# Patient Record
(demographics unavailable — no encounter records)

---
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
## 2024-10-15 NOTE — PLAN
[FreeTextEntry2] : Problems\par  1. Pt. with shame/embarrassment about appearance of sxs.\par  2. Pt. with difficulty coping with pain.\par  3. Pt. with ongoing insomnia and resulting daytime sleepiness\par  4. pt. with difficulty coping with stress - presently with several psychosocial stressors.\par  5. Pt. out of work.\par  tx will consist of CBT approach to anxiety and coping with hope of reducing somatic sxs. Treatment will also utilize goal setting framework to help pt. ideally increase functionality. \par   [Cognitive and/or Behavior Therapy] : Cognitive and/or Behavior Therapy  [Supportive Therapy] : Supportive Therapy [de-identified] : Session conducted via telehealth, mental status WNL.  Pt. continues to report stable mood despite ongoing stressors and pain.  Informed pt. of our pain team and encouraged pt. to consider scheduling a consultation with them.  Pt. discussed her efforts to be active despite the pain.  Reminded her of the importance of pacing and adaptation.  Pt. discussed interpersonal/family stressors.  Reflected her resilience in the face of these stressors and explored factors contributing to her coping.  Pt. noted benefit from shift in expectations and thoughts as well as renewed sense of kalpana. [FreeTextEntry1] : Pt. to engage in short-term CBT oriented psychotherapy.\par
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
## 2024-11-05 NOTE — DISCUSSION/SUMMARY
[FreeTextEntry1] : In summary, Ms. Monroy is a 50year old right handed woman being followed after sudden onset of abnormal movements. She states some improvement in her back pain, but there still is a lot of fluctuations. 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  Since last visit , worsening of low back pain with radiation, with burning pain in feet. her previous lumbar spine MRI showed disc herniation.  Plan: - Will obtain Mr lumbar spine to r/o structural pathology - patient had some neuropathy workup before, will obtain some more labs: B12, B6, Vit-E, protein electrophoresis.  - Advised to increase Gabapentin 600 mg , she will let know if it works.   Patient is seen and discussed with Dr. William HUERTA Movement Disorder Fellow

## 2024-11-05 NOTE — PHYSICAL EXAM
[Person] : oriented to person [Place] : oriented to place [Time] : oriented to time [Cranial Nerves Oculomotor (III)] : extraocular motion intact [Cranial Nerves Facial (VII)] : face symmetrical [Cranial Nerves Vestibulocochlear (VIII)] : hearing was intact bilaterally [Cranial Nerves Glossopharyngeal (IX)] : tongue and palate midline [Motor Strength] : muscle strength was normal in all four extremities [Motor Handedness Right-Handed] : the patient is right hand dominant [Sensation Tactile Decrease] : light touch was intact [Sensation Vibration Decrease] : vibration was intact [1+] : Brachioradialis left 1+ [0] : Ankle jerk left 0 [Paresis Pronator Drift Right-Sided] : no pronator drift on the right [Paresis Pronator Drift Left-Sided] : no pronator drift on the left [Coordination - Dysmetria Impaired Finger-to-Nose Bilateral] : not present [FreeTextEntry5] : No KF rings seen [FreeTextEntry6] : Left upper and lower extremity distally 4+/5 [FreeTextEntry1] : Facial expression and speech volume is normal.  Tone in voice is normal.  Extraocular movements were intact with no square waves jerk seen.  Able to protrude tongue for 10 seconds. No resting tremor. Improved postural tremor. upper body arrhythmic jerks noted that suppresses with distraction reflex: biceps: 2+ brachioradialis: 2+ patella:1+ ankle :2+ pin prick reduced in at foot improved at the mid calf level plantar down going  Walks with improved left limp. Tone normal.

## 2024-11-05 NOTE — RISK ASSESSMENT
Patient seen dr. Bustos in 2017 she said that she does not care who she sees Now Dr. farris or Dr. Ibrahim?    [No, patient denies ideation or behavior] : No, patient denies ideation or behavior [Low acute suicide risk] : Low acute suicide risk
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
## 2024-11-05 NOTE — PLAN
[FreeTextEntry2] : Problems\par  1. Pt. with shame/embarrassment about appearance of sxs.\par  2. Pt. with difficulty coping with pain.\par  3. Pt. with ongoing insomnia and resulting daytime sleepiness\par  4. pt. with difficulty coping with stress - presently with several psychosocial stressors.\par  5. Pt. out of work.\par  tx will consist of CBT approach to anxiety and coping with hope of reducing somatic sxs. Treatment will also utilize goal setting framework to help pt. ideally increase functionality. \par   [Cognitive and/or Behavior Therapy] : Cognitive and/or Behavior Therapy  [Supportive Therapy] : Supportive Therapy [de-identified] : Session conducted via telehealth, mental status WNL.  Pt. continues to report stable mood despite ongoing stressors and pain.  She noted that she has an upcoming appointment scheduled with Dr. Crowell for pain management as well as an upcoming appointment with Dr. Thomas for neurological follow up.  Since our last session, pt. has been doing PT consistently and has gone back to water aerobics 1x per week.  She noted ongoing pain but expressed some hopefulness around functional gains despite pain.  Discussed various sources of stress and reflected on patient's solid coping.  Commended her overall efforts. [FreeTextEntry1] : Pt. to engage in short-term CBT oriented psychotherapy.\par
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
## 2024-11-23 NOTE — PHYSICAL EXAM
[No Edema] : there was no peripheral edema [Normal] : soft, non-tender, non-distended, no masses palpated, no HSM and normal bowel sounds [de-identified] : slight resting tremor of left extremities

## 2024-11-23 NOTE — REVIEW OF SYSTEMS
[Recent Change In Weight] : ~T recent weight change [Negative] : Gastrointestinal [de-identified] : see hpi
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
## 2024-11-23 NOTE — PHYSICAL EXAM
[No Edema] : there was no peripheral edema [Normal] : soft, non-tender, non-distended, no masses palpated, no HSM and normal bowel sounds [de-identified] : slight resting tremor of left extremities
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
## 2024-11-23 NOTE — REVIEW OF SYSTEMS
[Recent Change In Weight] : ~T recent weight change [Negative] : Gastrointestinal [de-identified] : see hpi
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
## 2024-11-23 NOTE — PHYSICAL EXAM
[No Edema] : there was no peripheral edema [Normal] : soft, non-tender, non-distended, no masses palpated, no HSM and normal bowel sounds [de-identified] : slight resting tremor of left extremities
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
## 2024-11-23 NOTE — REVIEW OF SYSTEMS
[Recent Change In Weight] : ~T recent weight change [Negative] : Gastrointestinal [de-identified] : see hpi

## 2024-11-23 NOTE — REVIEW OF SYSTEMS
[Recent Change In Weight] : ~T recent weight change [Negative] : Gastrointestinal [de-identified] : see hpi

## 2024-11-23 NOTE — ADDENDUM
[FreeTextEntry1] : Incident report  Was sitting on a chair at the end of the visit and given a flu vaccine by nursing staff.  Patient stood up to leave with apparently felt lightheaded and fell on the floor.  Nurse was in the room with her back turned away from her and turned quickly when she heard the fall and appeared to have fallen more on the right side.  Her hearing fell off the right ear, possibly from the fall.  Patient was seen by me within less than 10 seconds of the fall as I was in the adjacent room  Patient was found lying on her back diffuse myoclonic jerks and not responsive to name for about 2 to 3 minutes.  Patient was moved to her right side and to protect her airway there is no sign of any vomitus or loss of bowel or bladder.  Patient gradually became more aware of his surroundings and able to answer appropriately calling  Had  myoclonic seizure at end of visit, sitting on chair and given the flu vaccine.  She stood up, and apparently felt lightheaded and nauseous and fell to ground , landing on her back and witnessed, by the nurse who was still in the room and turned around and saw her falling to the ground , most likely striking the back of her head VSS afebrile Glu 127 bp 130/70's O2 saturation 97% patient became responsive 3 to 5 minutes.  She did not bite her tongue nor did she lose control of her bowel and bladder.  Patient oriented to self and asked that her aunts be called so that she can go to the ER and refer her there, as she is being transported by an ambulance  Imp Known movement disorder with last seizure 6 months ago, now with continued myoclonic jerks that she has all the time with possible vasogenic syncope after being given a flu vaccine.  She probably had not much to eat or drink prior to coming to the office for her physical Plan TO ER by EMT-transported in stable condition Neurologist Nabil Thomas made aware Sister notified and to go to ER now in stable condition Total of 40 additional minutes spent with patient after she fell most likely from vasogenic syncope with persistent of her myoclonic jerks, and woke up with the 3 to 5 minutes of the fall and responding appropriately
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
## 2024-11-23 NOTE — PHYSICAL EXAM
[No Edema] : there was no peripheral edema [Normal] : soft, non-tender, non-distended, no masses palpated, no HSM and normal bowel sounds [de-identified] : slight resting tremor of left extremities
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
## 2024-12-08 NOTE — PLAN
[FreeTextEntry2] : Problems\par  1. Pt. with shame/embarrassment about appearance of sxs.\par  2. Pt. with difficulty coping with pain.\par  3. Pt. with ongoing insomnia and resulting daytime sleepiness\par  4. pt. with difficulty coping with stress - presently with several psychosocial stressors.\par  5. Pt. out of work.\par  tx will consist of CBT approach to anxiety and coping with hope of reducing somatic sxs. Treatment will also utilize goal setting framework to help pt. ideally increase functionality. \par   [Cognitive and/or Behavior Therapy] : Cognitive and/or Behavior Therapy  [Supportive Therapy] : Supportive Therapy [de-identified] : Session conducted via telehealth, mental status WNL.  She noted recent episode of fainting during a doctors visit.  Discussed possibility of vasovagal response from getting a shot.  Also discussed possible impact of building stress.  Pt. reported some improvement in pain with start of new physical therapy and exercise with a ball.  Pt. also more engaged with friends and family (with decrease in pain).  She noted some ongoing struggle with recent losses but noted effort to take steps forward.  Empathized with her ongoing grief while commending her efforts.     [FreeTextEntry1] : Pt. to engage in short-term CBT oriented psychotherapy.\par

## 2025-01-08 NOTE — CONSULT LETTER
[Dear  ___] : Dear  [unfilled], [Consult Letter:] : I had the pleasure of evaluating your patient, [unfilled]. [Please see my note below.] : Please see my note below. [Sincerely,] : Sincerely, [DrDenita  ___] : Dr. ALMAZAN [DrDenita ___] : Dr. ALMAZAN [Consult Closing:] : Thank you very much for allowing me to participate in the care of this patient.  If you have any questions, please do not hesitate to contact me. [FreeTextEntry3] : Susan M. Palleschi, MD, FACS Division of Breast Surgery Director, Breast Surgery [address] [address] (Phone) [phone] (Fax) [phone]

## 2025-01-08 NOTE — REVIEW OF SYSTEMS
[Negative] : Heme/Lymph [FreeTextEntry9] : Fibromyalgia, Degenerative Disc Disease [de-identified] : Non- epileptic seizures/tremors

## 2025-01-08 NOTE — ASSESSMENT
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 I had an extensive discussion with the patient informing her that this is a Stage 0 cancer which has an excellent prognosis with appropriate treatment.  1. Pending completion of her workup, the surgical treatment options of a left Brigid  localization wide excision lumpectomy, followed by post-operative XRT (to reduce risk of local recurrence), vs. left total mastectomy, vs. bilateral total mastectomies with injection of bilateral breasts with Magtrace, if she should so choose, were discussed in detail. The indications, alternatives, benefits and risks were discussed, including but not limited to bleeding, infection, pain, scar, swelling, numbness, change in breast appearance, recurrence (1% risk per year with breast conservation versus <=5% lifetime risk with mastectomy), potential need for additional surgery and lymphedema (if she undergoes LN bx; 5-8% risk with SLN biopsy, 30% risk with axillary LN dissection). The breast cancer booklet and postoperative instructions were reviewed and provided to the patient. 2. The lymphedema instruction sheet was reviewed and provided (only applies if she were to need a LN biopsy). 3. I discussed with her the potential for postoperative radiation therapy. 4. I discussed with her the potential for postoperative adjuvant therapy based on the final results of the surgical path, including the possibility of chemotherapy (if invasive cancer diagnosed) and/or anti-hormonal therapy as indicated. 5. Slide review: to be requested prior to proceeding with surgery. 6. Bilateral breast MRI with IV contrast: advise preop to rule out additional areas of disease for surgical planning. 7. Genetic testing: advise comprehensive genetic testing. I discussed with her that if the genetic testing reveals a breast cancer mutation, I will discuss with her the potential for bilateral mastectomies. 8. Reconstruction: discussed with patient option of reconstruction if she undergoes mastectomy(ies). 9. Additional left stereotactic biopsy to be done if she is considering breast conservation. 10. She is opting to undergo bilateral mastectomies to reduce her potential risk of local recurrence and to potentially avoid the need for radiation and the antihormonal medication. 11. She will see plastic surgeon, Dr. Carrera 1/15/25. 12. Her surgery will be scheduled thereafter. 13. Given her large, ptotic breasts, if she were interested in a nipple sparing approach, she would need a 2 staged approach with an initial lumpectomy with bilateral oncoplastic breast reduction, to be followed subsequently 3 months later with bilateral nipple areolar sparing mastectomies.  She defers undergoing nipple areolar sparing approach.  This patient encounter took 80 minutes today to perform records review, chart preparation, clinical encounter, coordination of care and documentation. I personally reviewed her breast imaging.

## 2025-01-08 NOTE — ASSESSMENT
[FreeTextEntry1] : Left breast ductal carcinoma in situ (DCIS), ER+/CA+. I had an extensive discussion with the patient informing her that this is a Stage 0 cancer which has an excellent prognosis with appropriate treatment.  1. Pending completion of her workup, the surgical treatment options of a left Brigid  localization wide excision lumpectomy, followed by post-operative XRT (to reduce risk of local recurrence), vs. left total mastectomy, vs. bilateral total mastectomies with injection of bilateral breasts with Magtrace, if she should so choose, were discussed in detail. The indications, alternatives, benefits and risks were discussed, including but not limited to bleeding, infection, pain, scar, swelling, numbness, change in breast appearance, recurrence (1% risk per year with breast conservation versus <=5% lifetime risk with mastectomy), potential need for additional surgery and lymphedema (if she undergoes LN bx; 5-8% risk with SLN biopsy, 30% risk with axillary LN dissection). The breast cancer booklet and postoperative instructions were reviewed and provided to the patient. 2. The lymphedema instruction sheet was reviewed and provided (only applies if she were to need a LN biopsy). 3. I discussed with her the potential for postoperative radiation therapy. 4. I discussed with her the potential for postoperative adjuvant therapy based on the final results of the surgical path, including the possibility of chemotherapy (if invasive cancer diagnosed) and/or anti-hormonal therapy as indicated. 5. Slide review: to be requested prior to proceeding with surgery. 6. Bilateral breast MRI with IV contrast: advise preop to rule out additional areas of disease for surgical planning. 7. Genetic testing: advise comprehensive genetic testing. I discussed with her that if the genetic testing reveals a breast cancer mutation, I will discuss with her the potential for bilateral mastectomies. 8. Reconstruction: discussed with patient option of reconstruction if she undergoes mastectomy(ies). 9. Additional left stereotactic biopsy to be done if she is considering breast conservation. 10. She is opting to undergo bilateral mastectomies to reduce her potential risk of local recurrence and to potentially avoid the need for radiation and the antihormonal medication. 11. She will see plastic surgeon, Dr. Carrera 1/15/25. 12. Her surgery will be scheduled thereafter. 13. Given her large, ptotic breasts, if she were interested in a nipple sparing approach, she would need a 2 staged approach with an initial lumpectomy with bilateral oncoplastic breast reduction, to be followed subsequently 3 months later with bilateral nipple areolar sparing mastectomies.  She defers undergoing nipple areolar sparing approach.  This patient encounter took 80 minutes today to perform records review, chart preparation, clinical encounter, coordination of care and documentation. I personally reviewed her breast imaging.

## 2025-01-08 NOTE — PHYSICAL EXAM
[Normocephalic] : normocephalic [Atraumatic] : atraumatic [EOMI] : extra ocular movement intact [Sclera nonicteric] : sclera nonicteric [Supple] : supple [No Supraclavicular Adenopathy] : no supraclavicular adenopathy [No Cervical Adenopathy] : no cervical adenopathy [No Thyromegaly] : no thyromegaly [Clear to Auscultation Bilat] : clear to auscultation bilaterally [Normal Sinus Rhythm] : normal sinus rhythm [Normal S1, S2] : normal S1 and S2 [No Gallops] : no gallops [No Rubs] : no pericardial rub [Examined in the supine and seated position] : examined in the supine and seated position [Bra Size: ___] : Bra Size: [unfilled] [No dominant masses] : no dominant masses in right breast  [No dominant masses] : no dominant masses left breast [No Nipple Retraction] : no left nipple retraction [No Nipple Discharge] : no left nipple discharge [No Axillary Lymphadenopathy] : no left axillary lymphadenopathy [No Edema] : no edema [No Rashes] : no rashes [No Ulceration] : no ulceration [Grade 3] : Ptosis Grade 3 [de-identified] : Core scar: 10:00 (N20cm, parasternal)

## 2025-01-08 NOTE — REVIEW OF SYSTEMS
[Negative] : Heme/Lymph [FreeTextEntry9] : Fibromyalgia, Degenerative Disc Disease [de-identified] : Non- epileptic seizures/tremors

## 2025-01-08 NOTE — CONSULT LETTER
[Dear  ___] : Dear  [unfilled], [Consult Letter:] : I had the pleasure of evaluating your patient, [unfilled]. [Please see my note below.] : Please see my note below. [Sincerely,] : Sincerely, [DrDenita  ___] : Dr. ALMAZAN [DrDenita ___] : Dr. ALMAZAN [Consult Closing:] : Thank you very much for allowing me to participate in the care of this patient.  If you have any questions, please do not hesitate to contact me. [FreeTextEntry3] : Susan M. Palleschi, MD, FACS Division of Breast Surgery Director, Breast Surgery [address] [address] (Phone) [phone] (Fax) [phone]

## 2025-01-08 NOTE — PHYSICAL EXAM
[Normocephalic] : normocephalic [Atraumatic] : atraumatic [EOMI] : extra ocular movement intact [Sclera nonicteric] : sclera nonicteric [Supple] : supple [No Supraclavicular Adenopathy] : no supraclavicular adenopathy [No Cervical Adenopathy] : no cervical adenopathy [No Thyromegaly] : no thyromegaly [Clear to Auscultation Bilat] : clear to auscultation bilaterally [Normal Sinus Rhythm] : normal sinus rhythm [Normal S1, S2] : normal S1 and S2 [No Gallops] : no gallops [No Rubs] : no pericardial rub [Examined in the supine and seated position] : examined in the supine and seated position [Bra Size: ___] : Bra Size: [unfilled] [No dominant masses] : no dominant masses in right breast  [No dominant masses] : no dominant masses left breast [No Nipple Retraction] : no left nipple retraction [No Nipple Discharge] : no left nipple discharge [No Axillary Lymphadenopathy] : no left axillary lymphadenopathy [No Edema] : no edema [No Rashes] : no rashes [No Ulceration] : no ulceration [Grade 3] : Ptosis Grade 3 [de-identified] : Core scar: 10:00 (N20cm, parasternal)

## 2025-01-14 NOTE — END OF VISIT
[facility] Approved until 10/13/2023.      Faxed Letter to [facility] and to [facility].     [Duration of Psychotherapy Visit (minutes spent in synchronous communication): ____] : Duration of Psychotherapy Visit (minutes spent in synchronous communication): [unfilled] [Individual Psychotherapy for 38-52 minutes] : Individual Psychotherapy for 38 - 52 minutes [Licensed Clinician] : Licensed Clinician

## 2025-01-14 NOTE — PLAN
[FreeTextEntry2] : Problems\par  1. Pt. with shame/embarrassment about appearance of sxs.\par  2. Pt. with difficulty coping with pain.\par  3. Pt. with ongoing insomnia and resulting daytime sleepiness\par  4. pt. with difficulty coping with stress - presently with several psychosocial stressors.\par  5. Pt. out of work.\par  tx will consist of CBT approach to anxiety and coping with hope of reducing somatic sxs. Treatment will also utilize goal setting framework to help pt. ideally increase functionality. \par   [Cognitive and/or Behavior Therapy] : Cognitive and/or Behavior Therapy  [Supportive Therapy] : Supportive Therapy [de-identified] : Session conducted via telehealth, mental status notable for upset, tearfulness.  Utilized supportive approach as pt. processed past several weeks and her experience thus far in going through a breast mammography and then biopsy.  Empathized with pt's upset around potential cancer diagnosis as well as her upset around way in which the information was conveyed to her.  Normalized her struggle to cope given the uncertainties of the situation.  Commended pt's use of a probelm-solving approach to build plan for diagnostic clarity and steps forward.  Reflected pt's past resilience in the face of hardship.  [FreeTextEntry1] : Pt. to engage in short-term CBT oriented psychotherapy.\par
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
## 2025-01-15 NOTE — PHYSICAL EXAM
[Bra Size: _______] : Bra Size: [unfilled] [de-identified] : large bilateral pendulous breasts, soft, nt. grade 3 nipple ptosis.  [de-identified] : abdomen soft, nt. +excess lower abdominal tissue-smaller than current breast volume. no obvious masses or hernias.

## 2025-01-15 NOTE — CONSULT LETTER
[Dear  ___] : Dear ~SHEEBA, [Consult Letter:] : I had the pleasure of evaluating your patient, [unfilled]. [Please see my note below.] : Please see my note below. [Consult Closing:] : Thank you very much for allowing me to participate in the care of this patient.  If you have any questions, please do not hesitate to contact me. [Sincerely,] : Sincerely, [FreeTextEntry2] : Susan Palleschi, MD [FreeTextEntry3] : Alexander Carrera MD, FACS Professor and System Vice-Chair, Department of Surgery, [facility] Director of Oncologic Reconstruction, [facility] , [facility]  [facility] [address] [place] [address] Phone: [phone] Fax: [phone] Email: [email or web address]

## 2025-01-15 NOTE — HISTORY OF PRESENT ILLNESS
[FreeTextEntry1] : 51 year old  female with history of recently diagnosed left breast DCIS presents today seeking breast reconstruction consultation. Pt has history of HL, abnormal movement disorder and lower back pain, possible fibromyalgia-followed by neurology and rheumatology. Myalgias controlled better with medical marijuana prescribed by pain management. Pt is s/p lower back surgery x 2 in the past (unsure of details), surgery of neck and right rotator cuff repair. Pt interested in b/l mastectomies with JOSIAS flap reconstruction. Genetic testing pending. She denies history of bleeding disorders, miscarriages, or blood clots. She does report history of an ectopic pregnancy.   She was referred by her PCP Dr. Keisha Phoenix Neuro: Dr. Nabil Thomas Rheum: Dr. Yoanna Carrillo Pain Management: Dr. Romero PMHx of abnormal movement disorder and lower back pain s/p surgery x 2, neck and shoulder surgery right rotator cuff repair,   Bra size: 40 DDD Family history: breast cancer in her distant maternal cousin diagnosed at age 65; her mother had ovarian cancer (in her 60's; she passed 65) SocHx: denies smoking, etOH, uses medical marijuana-gummies  On disability-prior to disability she was a  and , lives with son (age 23) and sister  NKDA

## 2025-01-15 NOTE — CONSULT LETTER
[Dear  ___] : Dear ~SHEEBA, [Consult Letter:] : I had the pleasure of evaluating your patient, [unfilled]. [Please see my note below.] : Please see my note below. [Consult Closing:] : Thank you very much for allowing me to participate in the care of this patient.  If you have any questions, please do not hesitate to contact me. [Sincerely,] : Sincerely, [FreeTextEntry2] : Susan Palleschi, MD [FreeTextEntry3] : Alexander Carrera MD, FACS Professor and System Vice-Chair, Department of Surgery, [facility] Director of Oncologic Reconstruction, [facility] , [facility]  [facility] [address] [place] [address] Phone: [phone] Fax: [phone] Email: [email or web address]

## 2025-01-15 NOTE — PHYSICAL EXAM
[Bra Size: _______] : Bra Size: [unfilled] [de-identified] : large bilateral pendulous breasts, soft, nt. grade 3 nipple ptosis.  [de-identified] : abdomen soft, nt. +excess lower abdominal tissue-smaller than current breast volume. no obvious masses or hernias.

## 2025-01-17 NOTE — HISTORY OF PRESENT ILLNESS
[FreeTextEntry1] : FLORIAN SERVIN is a 51 year female presenting to the office today with newly diagnosed right breast DCIS. Patient is considering bilateral mastectomies and presents today for her breast reconstruction options at time of surgery.  PMHx- Abnormal movement d/o, possible Fibromyalgia,  PSHx- Lower back surgery x2, neck surgery, Right RCR Allergies- denies  tobacco use Family history significant for distant maternal cousin with breast cancer and mother with ovarian cancer Work history-disability Bra size 40DDD Neuro: DR. Nabil Thomas Rheum: Dr. Yoanna Carrillo Pain management: DR Ritter

## 2025-01-17 NOTE — PHYSICAL EXAM
[NI] : Normal [de-identified] : NAD, AxOx3  [de-identified] : nonlabored breathing  [de-identified] : normal HR [de-identified] : Large pendulous breasts, grade 3 ptosis Right SN-N 38.5, N-IMF 21, BW 18 Left: SN-N 36.5, N-IMF 26, BW 17 [de-identified] : no edema  [de-identified] : grossly intact  [de-identified] : normal affect

## 2025-01-17 NOTE — PHYSICAL EXAM
[NI] : Normal [de-identified] : NAD, AxOx3  [de-identified] : nonlabored breathing  [de-identified] : normal HR [de-identified] : Large pendulous breasts, grade 3 ptosis Right SN-N 38.5, N-IMF 21, BW 18 Left: SN-N 36.5, N-IMF 26, BW 17 [de-identified] : no edema  [de-identified] : grossly intact  [de-identified] : normal affect

## 2025-02-04 NOTE — REVIEW OF SYSTEMS
[Negative] : Endocrine [FreeTextEntry7] : Environmental allergies [de-identified] : Fibromyalgia [de-identified] : Chronic pain syndrome [FreeTextEntry1] : DCIS

## 2025-02-04 NOTE — PHYSICAL EXAM
[Normal] : supple, no neck mass and thyroid not enlarged [Normal Neck Lymph Nodes] : normal neck lymph nodes  [Normal Supraclavicular Lymph Nodes] : normal supraclavicular lymph nodes [Normal Axillary Lymph Nodes] : normal axillary lymph nodes [Normal] : normal appearance, no rash, nodules, vesicles, ulcers, erythema [de-identified] : Groins not examined [de-identified] : See diagram

## 2025-02-04 NOTE — HISTORY OF PRESENT ILLNESS
[de-identified] : 51-year-old lady.  Referred by her internist: Dr. Keisha TIRADO.  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  Diagnosis was established on stereotactic sampling of suspicious microcalcifications identified on screening breast imaging.  Biopsy site is marked with a TOP-HAT CLIP.   DCIS spans ~4.5 cm on her breast MRI.  ER/RI +.  She has already met with Dr. Susan PALLESCHI (breast surgery) and Dr. Alexander WONG (Plastic surgery), And then Dr. Ivet HERNANDEZ (plastic surgery).  Genetic testin2025: My risk, [employer] genetics: NO deleterious mutations. VUS in AXIN 2.   + FH: Mother: Uterine cancer at age 60.  Maternal second cousin: Breast cancer.  Not Ashkenazi.  + Additional family history of malignancy: Father: Prostate cancer. Maternal uncle: Prostate cancer.   Sequence of breast imagin24: Annual bilateral mammogram at 450: BI-RADS 0. 1.  Left breast (LIQ) suspicious microcalcifications. 2.  Left breast (upper) focal asymmetry. 3.  No suspicious findings in the right breast.  2024: Diagnostic left mammogram and sonogram at 450: BI-RADS 4: 1.  Indeterminate calcifications in the left breast (UIQ). 2.  ***Additional, smaller grouping of calcifications visualized anteriorly. Management based upon above stereotactic sampling results. 3.  Left breast focal asymmetry thought to represent overlapping breast tissue. 6-month follow-up left mammogram and ultrasound recommended for 2025.................   2024: Stereotactic biopsy of left breast microcalcifications (UOQ) at 450: Ductal carcinoma in situ. + TOP-HAT CLIP.  ***Suspicious microcalcifications noted up to 3 cm anterior to the biopsy site, AND 3 cm inferior to the biopsy site. Amenable to additional stereotactic sampling..........................   2025: Breast MRI at 450: BI-RADS 6. Left breast (UIQ) known DCIS spans ~4.5 cm.  2025: Left axillary sonogram at 450: No adenopathy.   Menarche at 13.  2, para 1 at 24. LMP: 12/15/2024, her cycles are irregular. No exogenous hormones.   PMD: Dr. Keisha TIRADO.  NKDA.  No pacemaker or defibrillator. No anticoagulants.  + Hypercholesterolemia. Takes daily atorvastatin. She has not had to see a cardiologist.  + Chronic pain syndrome. Medications include gabapentin, clonazepam, cyclobenzaprine, venlafaxine and escitalopram.  Current pain management is Dr. Linda FRANK. Rheumatology: Dr. Yoanna LANCE. Neurology: Dr. Nabil ROGERS.  In the past she has had lumbar epidural injections. Pain management: Dr. Nicolas BECERRA.  2016: L5-S1 microdiscectomy: Dr. Ian CHAWLA.  2018: Discectomy, with revision of laminectomy by Dr. Jaguar PARIS.  2020: Anterior cervical disc fusion. Dr. Jaguar PARIS.  PM&R: Dr. Marija PAGE (previously).   GYN: Dr. Do RYAN. 2024 visit was unremarkable.    colonoscopy was normal. She was unable provide any other information.

## 2025-02-04 NOTE — ASSESSMENT
[FreeTextEntry1] : 51-year-old lady.  Being seen for a second opinion regarding left breast DCIS (UIQ) that spans ~4.5 cm. The peripheral areas are amenable to additional sampling, breast conserving therapy is being considered.  Tumor is ER/IL +.  Genetic testing shows no deleterious mutations.  Management options of breast conserving surgery versus mastectomy either with or without reconstruction were reviewed with her in detail, and all her questions were answered.  Our conversation included a discussion regarding the operative technique/approach, risk, benefits, alternatives, possible surgical outcomes.  Reviewed in detail, all questions answered.  She asked for a recommendation for an additional plastic surgeon, I suggested Dr. Azael Sheppard, and contacted him through secure email.  She will contact us if we can be of further assistance in her care.  Referring physician updated through secure email.

## 2025-02-04 NOTE — HISTORY OF PRESENT ILLNESS
[de-identified] : 51-year-old lady.  Referred by her internist: Dr. Keisha TIRADO.  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  Diagnosis was established on stereotactic sampling of suspicious microcalcifications identified on screening breast imaging.  Biopsy site is marked with a TOP-HAT CLIP.   DCIS spans ~4.5 cm on her breast MRI.  ER/IL +.  She has already met with Dr. Susan PALLESCHI (breast surgery) and Dr. Alexander WONG (Plastic surgery), And then Dr. Ivet HERNANDEZ (plastic surgery).  Genetic testin2025: My risk, [employer] genetics: NO deleterious mutations. VUS in AXIN 2.   + FH: Mother: Uterine cancer at age 60.  Maternal second cousin: Breast cancer.  Not Ashkenazi.  + Additional family history of malignancy: Father: Prostate cancer. Maternal uncle: Prostate cancer.   Sequence of breast imagin24: Annual bilateral mammogram at 450: BI-RADS 0. 1.  Left breast (LIQ) suspicious microcalcifications. 2.  Left breast (upper) focal asymmetry. 3.  No suspicious findings in the right breast.  2024: Diagnostic left mammogram and sonogram at 450: BI-RADS 4: 1.  Indeterminate calcifications in the left breast (UIQ). 2.  ***Additional, smaller grouping of calcifications visualized anteriorly. Management based upon above stereotactic sampling results. 3.  Left breast focal asymmetry thought to represent overlapping breast tissue. 6-month follow-up left mammogram and ultrasound recommended for 2025.................   2024: Stereotactic biopsy of left breast microcalcifications (UOQ) at 450: Ductal carcinoma in situ. + TOP-HAT CLIP.  ***Suspicious microcalcifications noted up to 3 cm anterior to the biopsy site, AND 3 cm inferior to the biopsy site. Amenable to additional stereotactic sampling..........................   2025: Breast MRI at 450: BI-RADS 6. Left breast (UIQ) known DCIS spans ~4.5 cm.  2025: Left axillary sonogram at 450: No adenopathy.   Menarche at 13.  2, para 1 at 24. LMP: 12/15/2024, her cycles are irregular. No exogenous hormones.   PMD: Dr. Keisha TIRADO.  NKDA.  No pacemaker or defibrillator. No anticoagulants.  + Hypercholesterolemia. Takes daily atorvastatin. She has not had to see a cardiologist.  + Chronic pain syndrome. Medications include gabapentin, clonazepam, cyclobenzaprine, venlafaxine and escitalopram.  Current pain management is Dr. Linda FRANK. Rheumatology: Dr. Yoanna LANCE. Neurology: Dr. Nabil ROGERS.  In the past she has had lumbar epidural injections. Pain management: Dr. Nicolas BECERRA.  2016: L5-S1 microdiscectomy: Dr. Ian CHAWLA.  2018: Discectomy, with revision of laminectomy by Dr. Jaguar PARIS.  2020: Anterior cervical disc fusion. Dr. Jaguar PARIS.  PM&R: Dr. Marija PAGE (previously).   GYN: Dr. Do RYAN. 2024 visit was unremarkable.    colonoscopy was normal. She was unable provide any other information.

## 2025-02-04 NOTE — HISTORY OF PRESENT ILLNESS
[de-identified] : 51-year-old lady.  Referred by her internist: Dr. Keisha TIRADO.  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  Diagnosis was established on stereotactic sampling of suspicious microcalcifications identified on screening breast imaging.  Biopsy site is marked with a TOP-HAT CLIP.   DCIS spans ~4.5 cm on her breast MRI.  ER/FL +.  She has already met with Dr. Susan PALLESCHI (breast surgery) and Dr. Alexander WONG (Plastic surgery), And then Dr. Ivet HERNANDEZ (plastic surgery).  Genetic testin2025: My risk, [employer] genetics: NO deleterious mutations. VUS in AXIN 2.   + FH: Mother: Uterine cancer at age 60.  Maternal second cousin: Breast cancer.  Not Ashkenazi.  + Additional family history of malignancy: Father: Prostate cancer. Maternal uncle: Prostate cancer.   Sequence of breast imagin24: Annual bilateral mammogram at 450: BI-RADS 0. 1.  Left breast (LIQ) suspicious microcalcifications. 2.  Left breast (upper) focal asymmetry. 3.  No suspicious findings in the right breast.  2024: Diagnostic left mammogram and sonogram at 450: BI-RADS 4: 1.  Indeterminate calcifications in the left breast (UIQ). 2.  ***Additional, smaller grouping of calcifications visualized anteriorly. Management based upon above stereotactic sampling results. 3.  Left breast focal asymmetry thought to represent overlapping breast tissue. 6-month follow-up left mammogram and ultrasound recommended for 2025.................   2024: Stereotactic biopsy of left breast microcalcifications (UOQ) at 450: Ductal carcinoma in situ. + TOP-HAT CLIP.  ***Suspicious microcalcifications noted up to 3 cm anterior to the biopsy site, AND 3 cm inferior to the biopsy site. Amenable to additional stereotactic sampling..........................   2025: Breast MRI at 450: BI-RADS 6. Left breast (UIQ) known DCIS spans ~4.5 cm.  2025: Left axillary sonogram at 450: No adenopathy.   Menarche at 13.  2, para 1 at 24. LMP: 12/15/2024, her cycles are irregular. No exogenous hormones.   PMD: Dr. Keisha TIRADO.  NKDA.  No pacemaker or defibrillator. No anticoagulants.  + Hypercholesterolemia. Takes daily atorvastatin. She has not had to see a cardiologist.  + Chronic pain syndrome. Medications include gabapentin, clonazepam, cyclobenzaprine, venlafaxine and escitalopram.  Current pain management is Dr. Linda FRANK. Rheumatology: Dr. Yoanna LANCE. Neurology: Dr. Nabil ROGERS.  In the past she has had lumbar epidural injections. Pain management: Dr. Nicolas BECERRA.  2016: L5-S1 microdiscectomy: Dr. Ian CHAWLA.  2018: Discectomy, with revision of laminectomy by Dr. Jaguar PARIS.  2020: Anterior cervical disc fusion. Dr. Jaguar PARIS.  PM&R: Dr. Marija PAGE (previously).   GYN: Dr. Do RYAN. 2024 visit was unremarkable.    colonoscopy was normal. She was unable provide any other information.

## 2025-02-04 NOTE — PHYSICAL EXAM
[Normal] : supple, no neck mass and thyroid not enlarged [Normal Neck Lymph Nodes] : normal neck lymph nodes  [Normal Supraclavicular Lymph Nodes] : normal supraclavicular lymph nodes [Normal Axillary Lymph Nodes] : normal axillary lymph nodes [Normal] : normal appearance, no rash, nodules, vesicles, ulcers, erythema [de-identified] : Groins not examined [de-identified] : See diagram

## 2025-02-04 NOTE — REASON FOR VISIT
[Initial Consultation] : an initial consultation for [Other: _____] : [unfilled] [FreeTextEntry2] : Additional opinion regarding recently diagnosed left breast ductal carcinoma in situ that spans ~4.5 cm on MRI.

## 2025-02-04 NOTE — REVIEW OF SYSTEMS
[Negative] : Endocrine [FreeTextEntry7] : Environmental allergies [de-identified] : Fibromyalgia [de-identified] : Chronic pain syndrome [FreeTextEntry1] : DCIS

## 2025-02-04 NOTE — PHYSICAL EXAM
[Normal] : supple, no neck mass and thyroid not enlarged [Normal Neck Lymph Nodes] : normal neck lymph nodes  [Normal Supraclavicular Lymph Nodes] : normal supraclavicular lymph nodes [Normal Axillary Lymph Nodes] : normal axillary lymph nodes [Normal] : normal appearance, no rash, nodules, vesicles, ulcers, erythema [de-identified] : Groins not examined [de-identified] : See diagram

## 2025-02-04 NOTE — ASSESSMENT
[FreeTextEntry1] : 51-year-old lady.  Being seen for a second opinion regarding left breast DCIS (UIQ) that spans ~4.5 cm. The peripheral areas are amenable to additional sampling, breast conserving therapy is being considered.  Tumor is ER/OK +.  Genetic testing shows no deleterious mutations.  Management options of breast conserving surgery versus mastectomy either with or without reconstruction were reviewed with her in detail, and all her questions were answered.  Our conversation included a discussion regarding the operative technique/approach, risk, benefits, alternatives, possible surgical outcomes.  Reviewed in detail, all questions answered.  She asked for a recommendation for an additional plastic surgeon, I suggested Dr. Azael Sheppard, and contacted him through secure email.  She will contact us if we can be of further assistance in her care.  Referring physician updated through secure email.

## 2025-02-04 NOTE — REVIEW OF SYSTEMS
[Negative] : Endocrine [FreeTextEntry7] : Environmental allergies [de-identified] : Fibromyalgia [de-identified] : Chronic pain syndrome [FreeTextEntry1] : DCIS

## 2025-02-04 NOTE — ASSESSMENT
[FreeTextEntry1] : 51-year-old lady.  Being seen for a second opinion regarding left breast DCIS (UIQ) that spans ~4.5 cm. The peripheral areas are amenable to additional sampling, breast conserving therapy is being considered.  Tumor is ER/SD +.  Genetic testing shows no deleterious mutations.  Management options of breast conserving surgery versus mastectomy either with or without reconstruction were reviewed with her in detail, and all her questions were answered.  Our conversation included a discussion regarding the operative technique/approach, risk, benefits, alternatives, possible surgical outcomes.  Reviewed in detail, all questions answered.  She asked for a recommendation for an additional plastic surgeon, I suggested Dr. Azael Sheppard, and contacted him through secure email.  She will contact us if we can be of further assistance in her care.  Referring physician updated through secure email.

## 2025-02-10 NOTE — HISTORY OF PRESENT ILLNESS
[FreeTextEntry1] : FMS and foot pain was advised trial of muscle relaxer for foot pain (? due to back DDD) did not get the Rx - will send today from me has been to PT with good success - this reduced the pain during sessions, and she is interested in returning

## 2025-02-13 NOTE — ADDENDUM
[FreeTextEntry1] : I, Guy Sheppard, NP, am scribing for and in the presence of Dr. Azael Sheppard MD, the following sections: HISTORY OF PRESENT ILLNESS, PAST MEDICAL/FAMILY/SOCIAL HISTORY, REVIEW OF SYSTEMS, VITAL SIGNS, PHYSICAL EXAM, & DISPOSITION.   I personally performed the services described in the documentation, reviewed the documentation recorded by the NP/scribe in my presence and it accurately and completely records my words and actions.

## 2025-02-13 NOTE — PHYSICAL EXAM
[TextEntry] : Bilateral breasts: Measurement in cm: Sternal Notch to Nipple: R - 37.5 L - 35 IMF to Nipple: R - 24 L - 26.5 Base Width: R - 18 L - 18 R>L +ptosis, large pedulous bresats,  Abd: Soft, NT, ND, +BS, no palpable masses or bulge +infraumbilical pannus however significantly smaller than patient's current breast volume  Constitutional: NAD, well-nourished HENT: Normocephalic, atraumatic, PERRL, non-icteric sclerae, neck supple, trachea midline PULM: LSCTAB, no wheezing or rhonchi CV: RRR, no murmur, rubs, or gallops MSK: NINO, 5/5 strength to all extremities Skin: No rash noted Neuro: A&O x 3, no FND Psych: Mood is appropriate
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
## 2025-02-13 NOTE — ASSESSMENT
[FreeTextEntry1] : FLORIAN SERVIN is a 51 year old F who presents for initial consultation for reconstructive options after planned bilateral mastectomy for DCIS of left breast.  The patient was counseled about reconstructive options following mastectomy, including autologous, prosthetic, and the options of foregoing reconstruction.  Additionally, she was explained the options regarding the timing of reconstruction, including immediate reconstruction at the time of mastectomy or delayed reconstruction at a later date.   The patient was extensively counseled on the operation and the perioperative recoveries of both autologous and prosthetic reconstructions.  The patient understands the risks with abdominally based microsurgical reconstruction including partial or total flap loss, revisit to the operating room in the [place]-operative period, wound dehiscence, mastectomy skin flap necrosis, fat necrosis, abdominal wall morbidity (laxity, bulge, hernia), asymmetry, paresthesia, seroma, hematoma, and infection.  She also understands the risks with implant-based reconstruction including infection, implant malposition, extrusion, deflation, capsular contracture, mastectomy skin flap necrosis, wound dehiscence, asymmetry, seroma, paresthesia, and hematoma. All questions answered.  - Plan for reconstruction of bilateral mastectomy with placement of tissue expanders, possible alloderm - Our office will coordinate with Dr. Aly - Surgical paperwork submitted

## 2025-03-07 NOTE — ASSESSMENT
[High Risk Surgery - Intraperitoneal, Intrathoracic or Supringuinal Vascular Procedures] : High Risk Surgery - Intraperitoneal, Intrathoracic or Supringuinal Vascular Procedures - No (0) [Ischemic Heart Disease] : Ischemic Heart Disease - No (0) [Congestive Heart Failure] : Congestive Heart Failure - No (0) [Prior Cerebrovascular Accident or TIA] : Prior Cerebrovascular Accident or TIA - No (0) [Creatinine >= 2mg/dL (1 Point)] : Creatinine >= 2mg/dL - No (0) [Insulin-dependent Diabetic (1 Point)] : Insulin-dependent Diabetic - No (0) [0] : 0 , RCRI Class: I, Risk of Post-Op Cardiac Complications: 3.9%, 95% CI for Risk Estimate: 2.8% - 5.4% [Patient Optimized for Surgery] : Patient optimized for surgery [No Further Testing Recommended] : no further testing recommended [Continue medications as is] : Continue current medications [As per surgery] : as per surgery [FreeTextEntry4] : Ms. DU HEAD is a 51 year old Black or  Loren  female  with history of Periodic limb movement disorder, unsteady gait, CLINTON and depression, cervical/Lumbar radiculopathy, fibromyalgia, DCIS of left breast, pendulous large breasts with grade 3 nipple ptosis, obesity presented today for preop clearance. Pt is low risk candidate for low risk procedure with no further w/up required prior to planned surgery and no contraindication to proceeding with planned surgery.   [FreeTextEntry7] : Do not take Aspirin, NSAID( Motrin, Ibuprofen etc.), Herb, supplement 7 days prior to surgery.

## 2025-03-07 NOTE — HISTORY OF PRESENT ILLNESS
[No Pertinent Cardiac History] : no history of aortic stenosis, atrial fibrillation, coronary artery disease, recent myocardial infarction, or implantable device/pacemaker [No Pertinent Pulmonary History] : no history of asthma, COPD, sleep apnea, or smoking [No Adverse Anesthesia Reaction] : no adverse anesthesia reaction in self or family member [(Patient denies any chest pain, claudication, dyspnea on exertion, orthopnea, palpitations or syncope)] : Patient denies any chest pain, claudication, dyspnea on exertion, orthopnea, palpitations or syncope [Moderate (4-6 METs)] : Moderate (4-6 METs) [Chronic Anticoagulation] : no chronic anticoagulation [Chronic Kidney Disease] : no chronic kidney disease [Diabetes] : no diabetes [FreeTextEntry1] :  bilateral mastectomies and tissue expanders, with subsequent implant reconstruction [FreeTextEntry2] : 3/12/25 [FreeTextEntry3] : Dr. Aly, breast surgery and Dr. Sheppard, plastic surgery combo surgery [FreeTextEntry4] : Ms. FLORIAN SERVIN is a 51 year old Black or  Loren  female  with history of Periodic limb movement disorder, unsteady gait, CLINTON and depression, cervical/Lumbar radiculopathy, fibromyalgia, DCIS of left breast, pendulous large breasts with grade 3 nipple ptosis, obesity presented today for preop clearance. Reviewed note by Dr. Sheppard, plastic surgery and Dr. Aly, surgical oncology.  She came alone and tells me that her surgery would take 7-8 hours and spend a night at the [facility].  Has chronic tremor and myalgia in whole body and her father had similar disease. She follows Dr. Romero for medical MJ. On Venlafaxine 75mg tid, Lexapro 10mg qd, Cyclobenzaprine 5mg qhs, Gabapentin 300mg po tid, and Clonazepam 2mg( 0.5tab) QID. She reports stable mood.  She initially wished flap reconstruction, but after  with doctors, will get b/l mastectomy and tissue expand then later to get breast reconstruction with implant. Denied fever, chills,CP, SOB, abdominal pain, n/v/c/d.

## 2025-03-07 NOTE — PHYSICAL EXAM
[No Lymphadenopathy] : no lymphadenopathy [Normal Appearance] : normal in appearance [No Nipple Discharge] : no nipple discharge [No Axillary Lymphadenopathy] : no axillary lymphadenopathy [de-identified] : WDWN in NAD HEENT:  unremarkable Neck:  supple, no JVD, no LN Lungs:  CTA B/L, no W/R/R Heart:  Reg rate, +S1S2, no M/R/G Abdomen:  soft, NT, ND, +BS, no masses, no HS-megaly Genital: No pubic or genital lesions noted. Ext:  no C/C/E Neuro:  no focal deficits [de-identified] : throat is high and crowded [de-identified] : b/l large breasts with nipple ptosis, No breast skin change, or evident mass on palpation.

## 2025-03-07 NOTE — RESULTS/DATA
[de-identified] : -Reviewed LAB 03/05/2025 : CBC, BMP normal . PT/INR/aPTT: normal -Reviewed ECG 03/05/2025 : results reviewed, Sinus Rhythm 80bpm, -Left atrial enlargement. No change with prior., nl axis/intervals, no acute ST/T changes, no LVH.

## 2025-03-07 NOTE — RESULTS/DATA
[de-identified] : -Reviewed LAB 03/05/2025 : CBC, BMP normal . PT/INR/aPTT: normal -Reviewed ECG 03/05/2025 : results reviewed, Sinus Rhythm 80bpm, -Left atrial enlargement. No change with prior., nl axis/intervals, no acute ST/T changes, no LVH.

## 2025-03-07 NOTE — HISTORY OF PRESENT ILLNESS
[No Pertinent Cardiac History] : no history of aortic stenosis, atrial fibrillation, coronary artery disease, recent myocardial infarction, or implantable device/pacemaker [No Pertinent Pulmonary History] : no history of asthma, COPD, sleep apnea, or smoking [No Adverse Anesthesia Reaction] : no adverse anesthesia reaction in self or family member [(Patient denies any chest pain, claudication, dyspnea on exertion, orthopnea, palpitations or syncope)] : Patient denies any chest pain, claudication, dyspnea on exertion, orthopnea, palpitations or syncope [Moderate (4-6 METs)] : Moderate (4-6 METs) [Chronic Anticoagulation] : no chronic anticoagulation [Chronic Kidney Disease] : no chronic kidney disease [Diabetes] : no diabetes [FreeTextEntry1] :  bilateral mastectomies and tissue expanders, with subsequent implant reconstruction [FreeTextEntry2] : 3/12/25 [FreeTextEntry3] : Dr. Aly, breast surgery and Dr. Sheppard, plastic surgery combo surgery [FreeTextEntry4] : Ms. FLORIAN SERVIN is a 51 year old Black or  Loren  female  with history of Periodic limb movement disorder, unsteady gait, CLINTON and depression, cervical/Lumbar radiculopathy, fibromyalgia, DCIS of left breast, pendulous large breasts with grade 3 nipple ptosis, obesity presented today for preop clearance. Reviewed note by Dr. Sheppard, plastic surgery and Dr. Aly, surgical oncology.  She came alone and tells me that her surgery would take 7-8 hours and spend a night at the [facility].  Has chronic tremor and myalgia in whole body and her father had similar disease. She follows Dr. Romero for medical MJ. On Venlafaxine 75mg tid, Lexapro 10mg qd, Cyclobenzaprine 5mg qhs, Gabapentin 300mg po tid, and Clonazepam 2mg( 0.5tab) QID. She reports stable mood.  She initially wished flap reconstruction, but after  with doctors, will get b/l mastectomy and tissue expand then later to get breast reconstruction with implant. Denied fever, chills,CP, SOB, abdominal pain, n/v/c/d.

## 2025-03-07 NOTE — PHYSICAL EXAM
[No Lymphadenopathy] : no lymphadenopathy [Normal Appearance] : normal in appearance [No Nipple Discharge] : no nipple discharge [No Axillary Lymphadenopathy] : no axillary lymphadenopathy [de-identified] : WDWN in NAD HEENT:  unremarkable Neck:  supple, no JVD, no LN Lungs:  CTA B/L, no W/R/R Heart:  Reg rate, +S1S2, no M/R/G Abdomen:  soft, NT, ND, +BS, no masses, no HS-megaly Genital: No pubic or genital lesions noted. Ext:  no C/C/E Neuro:  no focal deficits [de-identified] : throat is high and crowded [de-identified] : b/l large breasts with nipple ptosis, No breast skin change, or evident mass on palpation.

## 2025-03-07 NOTE — HISTORY OF PRESENT ILLNESS
[No Pertinent Cardiac History] : no history of aortic stenosis, atrial fibrillation, coronary artery disease, recent myocardial infarction, or implantable device/pacemaker [No Pertinent Pulmonary History] : no history of asthma, COPD, sleep apnea, or smoking [No Adverse Anesthesia Reaction] : no adverse anesthesia reaction in self or family member [(Patient denies any chest pain, claudication, dyspnea on exertion, orthopnea, palpitations or syncope)] : Patient denies any chest pain, claudication, dyspnea on exertion, orthopnea, palpitations or syncope [Moderate (4-6 METs)] : Moderate (4-6 METs) [Chronic Anticoagulation] : no chronic anticoagulation [Chronic Kidney Disease] : no chronic kidney disease [Diabetes] : no diabetes [FreeTextEntry1] :  bilateral mastectomies and tissue expanders, with subsequent implant reconstruction [FreeTextEntry2] : 3/12/25 [FreeTextEntry3] : Dr. Aly, breast surgery and Dr. Sheppard, plastic surgery combo surgery [FreeTextEntry4] : Ms. FLORIAN SERVIN is a 51 year old Black or  Loren  female  with history of Periodic limb movement disorder, unsteady gait, CLINTON and depression, cervical/Lumbar radiculopathy, fibromyalgia, DCIS of left breast, pendulous large breasts with grade 3 nipple ptosis, obesity presented today for preop clearance. Reviewed note by Dr. Sheppard, plastic surgery and Dr. Aly, surgical oncology.  She came alone and tells me that her surgery would take 7-8 hours and spend a night at the [facility].  Has chronic tremor and myalgia in whole body and her father had similar disease. She follows Dr. Romero for medical MJ. On Venlafaxine 75mg tid, Lexapro 10mg qd, Cyclobenzaprine 5mg qhs, Gabapentin 300mg po tid, and Clonazepam 2mg( 0.5tab) QID. She reports stable mood.  She initially wished flap reconstruction, but after  with doctors, will get b/l mastectomy and tissue expand then later to get breast reconstruction with implant. Denied fever, chills,CP, SOB, abdominal pain, n/v/c/d.

## 2025-03-07 NOTE — REVIEW OF SYSTEMS
[FreeTextEntry2] : Constitutional:  no fever and no chills.  Eyes:  no discharge.  HEENT:  no earache.  Cardiovascular:  no chest pain, no palpitations and no lower extremity edema.  Respiratory:  no shortness of breath, no wheezing and no cough.  Gastrointestinal:  no abdominal pain, no nausea and no vomiting.  Genitourinary:  no dysuria.  Musculoskeletal:  no joint pain.  Integumentary:  no itching.  Neurological:  no headache.  Psychiatric:  not suicidal.  Hematologic/Lymphatic:  no easy bleeding. [FreeTextEntry9] : see hpi [[email or web address]_ALL_OB_FT:[id number]=],[NS_DeliveryAssist1_OBGYN_ALL_OB_FT:[id number]=],[NS_DeliveryRN_OBGYN_ALL_OB_FT:[id number]=]

## 2025-03-07 NOTE — PHYSICAL EXAM
[No Lymphadenopathy] : no lymphadenopathy [Normal Appearance] : normal in appearance [No Nipple Discharge] : no nipple discharge [No Axillary Lymphadenopathy] : no axillary lymphadenopathy [de-identified] : WDWN in NAD HEENT:  unremarkable Neck:  supple, no JVD, no LN Lungs:  CTA B/L, no W/R/R Heart:  Reg rate, +S1S2, no M/R/G Abdomen:  soft, NT, ND, +BS, no masses, no HS-megaly Genital: No pubic or genital lesions noted. Ext:  no C/C/E Neuro:  no focal deficits [de-identified] : throat is high and crowded [de-identified] : b/l large breasts with nipple ptosis, No breast skin change, or evident mass on palpation.

## 2025-03-07 NOTE — RESULTS/DATA
[de-identified] : -Reviewed LAB 03/05/2025 : CBC, BMP normal . PT/INR/aPTT: normal -Reviewed ECG 03/05/2025 : results reviewed, Sinus Rhythm 80bpm, -Left atrial enlargement. No change with prior., nl axis/intervals, no acute ST/T changes, no LVH.
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
## 2025-03-25 NOTE — PLAN
[FreeTextEntry2] : Problems\par  1. Pt. with shame/embarrassment about appearance of sxs.\par  2. Pt. with difficulty coping with pain.\par  3. Pt. with ongoing insomnia and resulting daytime sleepiness\par  4. pt. with difficulty coping with stress - presently with several psychosocial stressors.\par  5. Pt. out of work.\par  tx will consist of CBT approach to anxiety and coping with hope of reducing somatic sxs. Treatment will also utilize goal setting framework to help pt. ideally increase functionality. \par   [Supportive Therapy] : Supportive Therapy [FreeTextEntry1] : Pt. to engage in short-term CBT oriented psychotherapy.\par   [de-identified] : Session conducted via telehealth, mental status notable for some level of sedation.  Utilized supportive approach as pt. processed experience of surgery.  She noted significant anxiety leading up to surgery but noted a certain calm immediately before.  Pt. expressed contentment with her decision to move forward with double mastectomy.  She reported significant post-op pain but is managing the pain with opioids (as prescribed by her surgeon).  Pt. expressed intention to gradually decrease opioids in order to be able to be more awake and functional.  She noted solid overall coping and feeling of being supported by family.

## 2025-03-26 NOTE — ADDENDUM
[FreeTextEntry1] :  This note was authored by Nancy Watson working as a scribe for Dr.Victor Sheppard. I, Dr. Azael Sheppard have reviewed the content of this note and confirm it is true and accurate. I personally performed the history and physical examination and made all the decisions 03/20/2025

## 2025-03-26 NOTE — ADDENDUM
[FreeTextEntry1] :  This note was authored by Nancy Wtason working as a scribe for Dr.Victor Sheppard. I, Dr. Azael Sheppard have reviewed the content of this note and confirm it is true and accurate. I personally performed the history and physical examination and made all the decisions 03/20/2025

## 2025-03-26 NOTE — ASSESSMENT
[FreeTextEntry1] : FLORIAN HEAD is a 51 year F s/p bilateral mastectomy with placement of tissue expanders, possible alloderm DOS: 3/13/2025.  BILATERAL BREAST: XPND CPX4 PLUS SM TE, 800CC  SHANI drains #1 and #4 were removed today. Pressure dressings applied. Remaining drains have >30 cc output. Dressings changed.   -Keep pressure dressings on for 3 days  -OK to shower, pat dry, do not scrub  -Cont surgical bra  -Cont drain log -F/u 1 week

## 2025-03-26 NOTE — PHYSICAL EXAM
[TextEntry] : Bilateral breasts:  SHANI drains #1 and #4 were removed today. Pressure dressings applied. Remaining drains have >30 cc output. Dressings changed. Remaining SHANI #2 and #3 milked. Incisions with prineo are c/d/i. No bleeding or drainage noted. No gaps or open areas.

## 2025-03-26 NOTE — HISTORY OF PRESENT ILLNESS
[FreeTextEntry1] : FLORIAN SERVIN is a 51 year F s/p bilateral mastectomy with placement of tissue expanders, possible alloderm DOS: 3/13/2025.  BILATERAL BREAST: XPND CPX4 PLUS SM TE, 800CC  She is POD#7. This is her first post-op visit.  Drain log reviewed.  Endorses manageable pain.  Otherwise, denies cp, sob, subjective fever, chills, headache, cough, myalgia, malaise, abd pain, n/v/d, decreased appetite, and generalized weakness.

## 2025-03-28 NOTE — PHYSICAL EXAM
[TextEntry] : Bilateral breasts:  Remaining SHANI drains removed. Pressure dressings applied.  Incisions with prineo are c/d/i. No bleeding or drainage noted. No gaps or open areas. site soft, nontender

## 2025-03-28 NOTE — ADDENDUM
[FreeTextEntry1] :  This note was authored by aNncy Watson working as a scribe for Dr.Victor Sheppard. I, Dr. Azael Sheppard have reviewed the content of this note and confirm it is true and accurate. I personally performed the history and physical examination and made all the decisions 03/27/2025

## 2025-03-28 NOTE — ADDENDUM
[FreeTextEntry1] :  This note was authored by Nancy Watson working as a scribe for Dr.Victor Sheppard. I, Dr. Azael Sheppard have reviewed the content of this note and confirm it is true and accurate. I personally performed the history and physical examination and made all the decisions 03/27/2025

## 2025-03-28 NOTE — HISTORY OF PRESENT ILLNESS
[FreeTextEntry1] : FLORIAN SERVIN is a 51 year F s/p bilateral mastectomy with placement of tissue expanders, possible alloderm DOS: 3/13/2025.  BILATERAL BREAST: XPND CPX4 PLUS SM TE, 800CC  Patient is doing well. Patient presents to drain removal. Drain log reviewed.  Otherwise, denies cp, sob, subjective fever, chills, headache, cough, myalgia, malaise, abd pain, n/v/d, decreased appetite, and generalized weakness.

## 2025-03-28 NOTE — ASSESSMENT
[FreeTextEntry1] : FLORIAN HEAD is a 51 year F s/p bilateral mastectomy with placement of tissue expanders, possible alloderm DOS: 3/13/2025.  BILATERAL BREAST: XPND CPX4 PLUS SM TE, 800CC  Remaining SHANI drains removed today. Pressure dressings applied.   -Keep dressings on for 3 days  -OK to shower, pat dry, do not scrub  -Cont surgical bra  -F/u next week to start TE fills

## 2025-04-06 NOTE — PHYSICAL EXAM
[No Acute Distress] : no acute distress [PERRL] : pupils equal round and reactive to light [EOMI] : extraocular movements intact [Normal Outer Ear/Nose] : the outer ears and nose were normal in appearance [Normal Oropharynx] : the oropharynx was normal [Normal] : no respiratory distress, lungs were clear to auscultation bilaterally and no accessory muscle use [de-identified] : Slightly injected sclera, normal conjunctiva [de-identified] : Boggy right-sided nasal turbinates, left nasal turbinates smooth

## 2025-04-06 NOTE — HISTORY OF PRESENT ILLNESS
[FreeTextEntry8] : Here for an acute visit S/p mastectomy March 12, 2025.  Noted her right eye was injected after surgery, no tearing or discharge, no upper respiratory symptoms, no seasonal allergies, no nasal stuffiness noted, no pets but right nostril noted to be full compared to the left side and no watery discharge noted.  Denies visual changes, no feeling of grittiness or foreign body in the eye, no eye pain.  Does not wear contacts or glasses.  No history of glaucoma.

## 2025-04-07 NOTE — PHYSICAL EXAM
[TextEntry] : Bilateral breasts:  Prineo removed today. Brown tape applied. Incisions are c/d/i. No bleeding or drainage noted. No gaps or open areas. site soft, nontender Bilateral TE's air removed 450 cc were injected into b/l TE.  45 cc seroma aspirated from the left

## 2025-04-07 NOTE — ASSESSMENT
[FreeTextEntry1] : FLORIAN SERVIN is a 51 year F s/p bilateral mastectomy with placement of tissue expanders, possible alloderm DOS: 3/13/2025.  BILATERAL BREAST: XPND CPX4 PLUS SM TE, 800CC   Bilateral chest were marked using magnetic port. The patient's chest was prepped sterilely. Using sterile technique,  450 cc's of normal saline were injected into bilateral tissue expanders. The patient tolerated well without any incidents.   Right Tissue Expander: 0 + 450 = 450 cc's total. Left Tissue Expander: 0 + 450 = 450 cc's total. 45 cc seroma aspirated from the left    - The patient was encouraged to massage the incision site 2-3 times per day for 8-10 minutes with lotion, vitamin E, or cocoa butter to encourage blood flow and to decrease scar tissue formation. - Continue supportive bra. - Follow up 1 to reevaluate for further expansion and possible aspiration

## 2025-04-07 NOTE — HISTORY OF PRESENT ILLNESS
[FreeTextEntry1] : FLORIAN SERVIN is a 51 year F s/p bilateral mastectomy with placement of tissue expanders, possible alloderm DOS: 3/13/2025.  BILATERAL BREAST: XPND CPX4 PLUS SM TE, 800CC  Accompanied by .  Here today to start TE fills.  Has made an appt with med/onc for the end of April  Otherwise, denies cp, sob, subjective fever, chills, headache, cough, myalgia, malaise, abd pain, n/v/d, decreased appetite, and generalized weakness.

## 2025-04-07 NOTE — ASSESSMENT
[FreeTextEntry1] : FLORIAN SERVIN is a 51 year F s/p bilateral mastectomy with placement of tissue expanders, possible alloderm DOS: 3/13/2025.  BILATERAL BREAST: XPND CPX4 PLUS SM TE, 800CC   Bilateral chest were marked using magnetic port. The patient's chest was prepped sterilely. Using sterile technique,  450 cc's of normal saline were injected into bilateral tissue expanders. The patient tolerated well without any incidents.   Right Tissue Expander: 0 + 450 = 450 cc's total. Left Tissue Expander: 0 + 450 = 450 cc's total. 45 cc seroma aspirated from the left    - The patient was encouraged to massage the incision site 2-3 times per day for 8-10 minutes with lotion, vitamin E, or cocoa butter to encourage blood flow and to decrease scar tissue formation. - Continue supportive bra. - Follow up 1 to reevaluate for further expansion and possible aspiration    Male

## 2025-04-07 NOTE — ADDENDUM
[FreeTextEntry1] :  This note was authored by Nancy Watson working as a scribe for Dr.Victor Sheppard. I, Dr. Azael Sheppard have reviewed the content of this note and confirm it is true and accurate. I personally performed the history and physical examination and made all the decisions 04/03/2025

## 2025-04-09 NOTE — HISTORY OF PRESENT ILLNESS
[de-identified] : 52 yo uses q tips and recently had left bloody otorrhea also h/o hoarseness and GERD' recent Breast ca surgery no other modifying factors no other nasal or throat complaints

## 2025-04-09 NOTE — PROCEDURE
[FreeTextEntry3] : Procedure - Cerumen Removal. Indication - Obstructing visualization of TM After informed verbal consent is obtained, cerumen was removed from the    both      ear canal(s) with a curette and suction.  Normal appearing canal(s) following removal. [de-identified] : Reason for the procedure-throat symptoms not adequately evaluated by mirror exam After informed verbal consent is obtained.  The fiberoptic laryngoscope #  is passed via the  nasal cavity. There is no adenoid hypertrophy of the nasopharynx.    Tongue Base-wnl  Larynx-wnl Hypopharynx-wnl  tongue base,  vallecular-wnl epiglottis-wnl subglottis-wnl posterior pharyngeal wall-wnl  true vocal cords-wnl arytenoids-erythema and edema false vocal cords-wnl ventricles-wnl  pyriform sinus-wnl no pooling aryepiglottic folds-wnl

## 2025-04-09 NOTE — PHYSICAL EXAM
[de-identified] : bilat wax and contusion on floor of left EAC [Midline] : trachea located in midline position [Laryngoscopy Performed] : laryngoscopy was performed, see procedure section for findings [Normal] : no rashes

## 2025-04-09 NOTE — ASSESSMENT
[FreeTextEntry1] : wax- Rx:  Debrox was prescribed and  is to be placed in both ears on a routine basis to keep them free of wax. Routine debridement suggested to keep the ears free of wax.  GERD- Antireflux recommendations were given to the patient A formal GI evaluation may be needed and was discussed with the patient.

## 2025-04-10 NOTE — HISTORY OF PRESENT ILLNESS
[FreeTextEntry1] : FLORIAN SERVIN is a 51 year F s/p bilateral mastectomy with placement of tissue expanders, possible alloderm DOS: 3/13/2025.  BILATERAL BREAST: XPND CPX4 PLUS SM TE, 800CC  Here today for TE fills.  Has been massaging the scars. ROM is improving  Is seeing her oncologist in 2 weeks  Otherwise, denies cp, sob, subjective fever, chills, headache, cough, myalgia, malaise, abd pain, n/v/d, decreased appetite, and generalized weakness.

## 2025-04-10 NOTE — ASSESSMENT
[FreeTextEntry1] : FLORIAN SERVIN is a 51 year F s/p bilateral mastectomy with placement of tissue expanders, possible alloderm DOS: 3/13/2025.  BILATERAL BREAST: XPND CPX4 PLUS SM TE, 800CC  Bilateral chest were marked using magnetic port. The patient's chest was prepped sterilely. Using sterile technique,  100 cc's of normal saline were injected into bilateral tissue expanders. The patient tolerated well without any incidents.   Right Tissue Expander: 450 + 100 = 550 cc's total. Left Tissue Expander: 450 + 100 = 550 cc's total.  80 cc seroma aspirated from the right breast  70 cc seroma aspirated from the left breast    - The patient was encouraged to massage the incision site 2-3 times per day for 8-10 minutes with lotion, vitamin E, or cocoa butter to encourage blood flow and to decrease scar tissue formation. - Continue supportive bra. -[facility] PT script provided  - Follow up 1 to reevaluate for further expansion and possible aspiration

## 2025-04-10 NOTE — PHYSICAL EXAM
[TextEntry] : Bilateral breasts:  Incisions are c/d/i. No bleeding or drainage noted. No gaps or open areas. site soft, nontender 100 cc were injected into b/l TE.  80 cc seroma aspirated from the right breast  70 cc seroma aspirated from the left breast

## 2025-04-10 NOTE — ADDENDUM
[FreeTextEntry1] :  This note was authored by Nancy Watson working as a scribe for Dr.Victor Sheppard. I, Dr. Azael Sheppard have reviewed the content of this note and confirm it is true and accurate. I personally performed the history and physical examination and made all the decisions 04/10/2025

## 2025-04-17 NOTE — PHYSICAL EXAM
[TextEntry] : Bilateral breasts:  Incisions are c/d/i. No bleeding or drainage noted. No gaps or open areas. site soft, nontender 100 cc were injected into b/l TE.  150 cc seroma aspirated from the right breast  45 cc seroma aspirated from the left breast

## 2025-04-17 NOTE — HISTORY OF PRESENT ILLNESS
[FreeTextEntry1] : FLORIAN SERVIN is a 51 year F s/p bilateral mastectomy with placement of tissue expanders, possible alloderm DOS: 3/13/2025.  BILATERAL BREAST: XPND CPX4 PLUS SM TE, 800CC  Here today for TE fills.  Has been massaging the scars.  ROM is improving some   Has an appt with her oncologist this upcoming Monday to discuss if any further treatment is needed.  Otherwise, denies cp, sob, subjective fever, chills, headache, cough, myalgia, malaise, abd pain, n/v/d, decreased appetite, and generalized weakness.

## 2025-04-17 NOTE — ASSESSMENT
[FreeTextEntry1] : FLORIAN SERVIN is a 51 year F s/p bilateral mastectomy with placement of tissue expanders, possible alloderm DOS: 3/13/2025.  BILATERAL BREAST: XPND CPX4 PLUS SM TE, 800CC  Bilateral chest were marked using magnetic port. The patient's chest was prepped sterilely. Using sterile technique,  100 cc's of normal saline were injected into bilateral tissue expanders. The patient tolerated well without any incidents.   Right Tissue Expander: 550 + 100 = 650 cc's total. Left Tissue Expander: 550 + 100 = 650 cc's total.  150 cc seroma aspirated from the right breast  45 cc seroma aspirated from the left breast    - The patient was encouraged to massage the incision site 2-3 times per day for 8-10 minutes with lotion, vitamin E, or cocoa butter to encourage blood flow and to decrease scar tissue formation. - Continue supportive bra. - Follow up 1 to reevaluate for further expansion and possible aspiration

## 2025-04-17 NOTE — ADDENDUM
[FreeTextEntry1] :  This note was authored by Nancy Watson working as a scribe for Dr.Victor Sheppard. I, Dr. Azael Sheppard have reviewed the content of this note and confirm it is true and accurate. I personally performed the history and physical examination and made all the decisions 04/17/2025

## 2025-04-19 NOTE — PLAN
[FreeTextEntry2] : Problems\par  1. Pt. with shame/embarrassment about appearance of sxs.\par  2. Pt. with difficulty coping with pain.\par  3. Pt. with ongoing insomnia and resulting daytime sleepiness\par  4. pt. with difficulty coping with stress - presently with several psychosocial stressors.\par  5. Pt. out of work.\par  tx will consist of CBT approach to anxiety and coping with hope of reducing somatic sxs. Treatment will also utilize goal setting framework to help pt. ideally increase functionality. \par   [Supportive Therapy] : Supportive Therapy [de-identified] : Session conducted via telehealth, mental status WNL.  Pt. reporting progress in her post-op recovery.  She continues to have pain/soreness but discontinued her pain medication on her own.  Pt. briefly discussed some complications over the past couple of weeks (eye infection and possible ear infection) but noted gradual recovery from these issues.  Processed pt's feelings related to change in her diagnosis.  Normalized pt's frustration/upset.  Began to process pt's feelings around changes to her body and possibility of needing additional treatment.   [FreeTextEntry1] : Pt. to engage in short-term CBT oriented psychotherapy.\par
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
## 2025-04-21 NOTE — HISTORY OF PRESENT ILLNESS
[de-identified] : Age 51: left breast cancer Screen detected: she had mammogram done on 12/16/2024 which showed left breast indeterminate calcifications in the upper inner posterior breast. She had left medial breast biopsy on 1/3/2025 which showed DCIS that was >95%, LA >90%. She had MRI of the breast done on 1/13/2025 which showed left upper inner breast biopsy proven DCIS spanning 4.5 cm and mildly prominent L axillary LN. She had ultrasound of the left axillary LN on 1/21/2025 that showed no abnormal LN. She underwent left mastectomy with SLNB and prophylactic right mastectomy with SLNB o 3/12/2025 with Dr Aly. The pathology showed left mastectomy invasive ductal carcinoma measuring 8 mm and DCIS spanning 38 mm along with negative SLNB (0/4). IHC ER %, LA 80%, Her2 negative (0). The prophylactic right breast mastectomy showed fibroproliferative changes and negative SLNB (0/3). She had Oncotype Dx done with score of 11 indicating 9 year risk of distant recurrence of 3% with endocrine therapy.  [de-identified] : invasive ductal carcinoma ER 91 to 100% MO 80%, Her2 negative  [de-identified] : She is present to review adjuvant recommendations. She had last period prior to her surgery 3/2025: periods have been irregular. Denies any current hot flashes. Has baseline joint pain that she is on venlafaxine and gabapentin for. She also has anxiety for which she takes escitalopram. She is in support group with other breast cancer pts. Has been getting expanders filled.  [de-identified] : [facility] MyRisk: no actionable mutations 2025: VUS AXIN2

## 2025-04-21 NOTE — REVIEW OF SYSTEMS
[Joint Pain] : joint pain [Joint Stiffness] : no joint stiffness [Muscle Pain] : no muscle pain [Muscle Weakness] : no muscle weakness [Skin Rash] : no skin rash [Skin Wound] : no skin wound [Suicidal] : not suicidal [Insomnia] : no insomnia [Anxiety] : anxiety [Depression] : no depression [de-identified] : discomfort over the expanders

## 2025-04-21 NOTE — CONSULT LETTER
[Dear  ___] : Dear  [unfilled], [Consult Letter:] : I had the pleasure of evaluating your patient, [unfilled]. [( Thank you for referring [unfilled] for consultation for _____ )] : Thank you for referring [unfilled] for consultation for [unfilled] [Please see my note below.] : Please see my note below. [Consult Closing:] : Thank you very much for allowing me to participate in the care of this patient.  If you have any questions, please do not hesitate to contact me. [Sincerely,] : Sincerely, [DrDenita  ___] : Dr. ALMAZAN [DrDenita ___] : Dr. ALMAZAN [FreeTextEntry2] : [place] [address] [FreeTextEntry3] : Kip Ma MD Attending [facility]

## 2025-04-21 NOTE — ASSESSMENT
[FreeTextEntry1] : She is a 52 y/o BRCA negative premenopausal F with left breast Stage I (T1N0) invasive ductal carcinoma that is ER %, GA 80%, Her2 negative s/p left mastectomy and SLNB and prophylactic right mastectomy with SLNB on 3.12.2025 with Dr Aly. We reviewed the significance of ER positive, LN negative breast cancer and the risk of breast cancer recurrence.. We reviewed the role of adjuvant therapy to reduce the risk of breast cancer recurrence. We reviewed with Oncotype score of 11, there is no role for chemotherapy: risks outweigh any benefits. We reviewed with her being premenopausal: tamoxifen is currently the best option. Reviewed once she becomes postmenopausal: 1 year no period, then she would have more options with aromatase inhibitors.   We reviewed tamoxifen for premenopausal women for up to 10 years. We reviewed the potential side effects of endocrine therapy including but not limited to: hot flash, GI upset, bone stiffness/ arthralgias, fatigue, and weight changes. We reviewed supportive measures to decrease these side effects. We reviewed bone health with calcium and Vitamin D supplementation. We reviewed the less than 1% risk of uterine cancer and blood thickness on tamoxifen. For premenopausal women, the benefits of tamoxifen outweigh the risks. Written information on tamoxifen was provided to her. Questions were answered to her satisfaction. She understands that if she becomes postmenopausal (either naturally or with ovarian ablation), there are additional endocrine options (aromatase inhibitors). She consents to tamoxifen. We reviewed low fat diet and exercise daily to help improve breast cancer survival as per WINS study. Next follow up in 3 months but earlier if any new symptoms. [Curative] : Goals of care discussed with patient: Curative

## 2025-04-25 NOTE — ASSESSMENT
[FreeTextEntry1] : FLORIAN SERVIN is a 51 year F s/p bilateral mastectomy with placement of tissue expanders, possible alloderm DOS: 3/13/2025.  BILATERAL BREAST: XPND CPX4 PLUS SM TE, 800CC  Bilateral chest were marked using magnetic port. The patient's chest was prepped sterilely. Using sterile technique, 50 cc's of normal saline were injected into bilateral tissue expanders. The patient tolerated well without any incidents.   Right Tissue Expander: 650 + 50 = 700 cc's total. Left Tissue Expander: 650 + 50 = 700 cc's total.  Aspirated 30 cc seroma from left  Aspirated 100 cc seroma from right    - The patient was encouraged to massage the incision site 2-3 times per day for 8-10 minutes with lotion, vitamin E, or cocoa butter to encourage blood flow and to decrease scar tissue formation. - Continue supportive bra. - Follow up 1 to reevaluate for further expansion and possible aspiration

## 2025-04-25 NOTE — HISTORY OF PRESENT ILLNESS
[FreeTextEntry1] : FLORIAN SERVIN is a 51 year F s/p bilateral mastectomy with placement of tissue expanders, possible alloderm DOS: 3/13/2025.  BILATERAL BREAST: XPND CPX4 PLUS SM TE, 800CC  Here today for TE fills.  Pt met with onc, does not require chemo or radiation, started tamoxifen  Has been massaging the scars  Otherwise, denies cp, sob, subjective fever, chills, headache, cough, myalgia, malaise, abd pain, n/v/d, decreased appetite, and generalized weakness.

## 2025-04-25 NOTE — ADDENDUM
[FreeTextEntry1] :  This note was authored by Nancy Watson working as a scribe for Dr.Victor Sheppard. I, Dr. Azael Sheppard have reviewed the content of this note and confirm it is true and accurate. I personally performed the history and physical examination and made all the decisions 04/24/2025

## 2025-04-25 NOTE — PHYSICAL EXAM
[TextEntry] : Bilateral breasts:  Incisions are c/d/i. No bleeding or drainage noted. No gaps or open areas. site soft, nontender 50 cc were injected into b/l TE.  Aspirated 30 cc seroma from left  Aspirated 100 cc seroma from right

## 2025-05-01 NOTE — ASSESSMENT
[FreeTextEntry1] : FLORIAN SERVIN is a 51 year F s/p bilateral mastectomy with placement of tissue expanders, possible alloderm DOS: 3/13/2025.  BILATERAL BREAST: XPND CPX4 PLUS SM TE, 800CC  Bilateral chest were marked using magnetic port. The patient's chest was prepped sterilely. Using sterile technique, 50 cc's of normal saline were injected into bilateral tissue expanders. The patient tolerated well without any incidents.   Right Tissue Expander: 700 + 50 = 750 cc's total. Left Tissue Expander: 700 + 50 = 750 cc's total.  Aspirated 35 cc seroma from left  Aspirated 35 cc seroma from right    - The patient was encouraged to massage the incision site 2-3 times per day for 8-10 minutes with lotion, vitamin E, or cocoa butter to encourage blood flow and to decrease scar tissue formation. - Continue supportive bra. - Follow up 1 to reevaluate for further expansion and possible aspiration

## 2025-05-01 NOTE — HISTORY OF PRESENT ILLNESS
[FreeTextEntry1] : FLORIAN SERVIN is a 51 year F s/p bilateral mastectomy with placement of tissue expanders, possible alloderm DOS: 3/13/2025.  BILATERAL BREAST: XPND CPX4 PLUS SM TE, 800CC  Here today for TE fills.  Denies cp, sob, subjective fever, chills, headache, cough, myalgia, malaise, abd pain, n/v/d, decreased appetite, and generalized weakness.

## 2025-05-01 NOTE — ADDENDUM
[FreeTextEntry1] :  This note was authored by Nancy Watson working as a scribe for Dr.Victor Sheppard. I, Dr. Azael Sheppard have reviewed the content of this note and confirm it is true and accurate. I personally performed the history and physical examination and made all the decisions 05/01/2025

## 2025-05-01 NOTE — PHYSICAL EXAM
[TextEntry] : Bilateral breasts:  Incisions are c/d/i. No bleeding or drainage noted. No gaps or open areas. site soft, nontender 50 cc were injected into b/l TE.  Aspirated 35 cc seroma from left  Aspirated 35 cc seroma from right

## 2025-05-02 NOTE — PHYSICAL EXAM
[General Appearance - Alert] : alert [General Appearance - In No Acute Distress] : in no acute distress [Oriented To Time, Place, And Person] : oriented to person, place, and time [Impaired Insight] : insight and judgment were intact [FreeTextEntry1] : no synovitis    5-6 tenderpoints

## 2025-05-02 NOTE — HISTORY OF PRESENT ILLNESS
[FreeTextEntry1] : fuv - seen by me for fibromyalgia. Takes Gabapentin TID, muscle relaxer PRN Today we spend much of the visit discussing the following:  Pt was recently diagnosed with breast cancer stage 1 - did not have chemotherapy Pt double mastectomy with LN dissection w/ reconstruction/tissue expanders about 1 month ago feels pain, tightness in the expanders pending initiation of physical therapy and determination if XRT is needed along w/ tamoxifen We had an extended conversation about the diagnosis, her ongoing and upcoming treatments, as well as implications and her feelings about having cancer.    from last visit Pt has history of FMS and foot pain.  Pt is undergoing PT right now and finds it helpful.   She was prescribed a mm relaxant which she takes 1-2 tablets per day with good response.  She was prescribed 7.5 mg of cyclobenzaprine but that was not covered.  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

## 2025-05-02 NOTE — ASSESSMENT
[FreeTextEntry1] : fuv - seen by me for fibromyalgia. Takes Gabapentin TID, muscle relaxer PRN Today we spend much of the visit discussing the following:  Pt was recently diagnosed with breast ductal carcinoma  Pt double mastectomy with LN dissection w/ reconstruction/tissue expanders about 1 month ago feels pain, tightness in the expanders pending initiation of physical therapy and determination if XRT is needed along w/ tamoxifen   We had an extended conversation about the diagnosis, her ongoing and upcoming treatments, as well as implications and her feelings about having cancer.  >> PT for the above  >> continue current RX  >> pt will call MD if any changes or pain increase given the above

## 2025-05-15 NOTE — PHYSICAL EXAM
[TextEntry] : Bilateral breasts:  Incisions are c/d/i. No bleeding or drainage noted. No gaps or open areas. site soft, nontender 50 cc were injected into b/l TE.

## 2025-05-15 NOTE — HISTORY OF PRESENT ILLNESS
[FreeTextEntry1] : FLORIAN SERVIN is a 51 year F s/p bilateral mastectomy with placement of tissue expanders, possible alloderm DOS: 3/13/2025.  BILATERAL BREAST: XPND CPX4 PLUS SM TE, 800CC  Here today for further TE fills.  Has been massaging the scars  Denies cp, sob, subjective fever, chills, headache, cough, myalgia, malaise, abd pain, n/v/d, decreased appetite, and generalized weakness.

## 2025-05-15 NOTE — ASSESSMENT
[FreeTextEntry1] : FLORIAN SERVIN is a 51 year F s/p bilateral mastectomy with placement of tissue expanders, possible alloderm DOS: 3/13/2025.  BILATERAL BREAST: XPND CPX4 PLUS SM TE, 800CC  Bilateral chest were marked using magnetic port. The patient's chest was prepped sterilely. Using sterile technique, 50 cc's of normal saline were injected into bilateral tissue expanders. The patient tolerated well without any incidents.   Right Tissue Expander: 750 + 50 = 800 cc's total. Left Tissue Expander: 750 + 50 = 800 cc's total.  - The patient was encouraged to massage the incision site 2-3 times per day for 8-10 minutes with lotion, vitamin E, or cocoa butter to encourage blood flow and to decrease scar tissue formation. - Continue supportive bra. - Follow up 1 to reevaluate for further expansion and possible aspiration

## 2025-05-15 NOTE — ADDENDUM
[FreeTextEntry1] :  This note was authored by Nancy Watson working as a scribe for Dr.Victor Sheppard. I, Dr. Azael Sheppard have reviewed the content of this note and confirm it is true and accurate. I personally performed the history and physical examination and made all the decisions 05/15/2025

## 2025-05-29 NOTE — PHYSICAL EXAM
[TextEntry] : Bilateral breasts:  Incisions are c/d/i. No bleeding or drainage noted. No gaps or open areas. site soft, nontender 50 cc were injected into b/l TE.  Photos taken today

## 2025-05-29 NOTE — ADDENDUM
[FreeTextEntry1] :  This note was authored by Nancy Watson working as a scribe for Dr.Victor Sheppard. I, Dr. Azael Sheppard have reviewed the content of this note and confirm it is true and accurate. I personally performed the history and physical examination and made all the decisions 05/29/2025

## 2025-05-29 NOTE — ASSESSMENT
[FreeTextEntry1] : FLORIAN SERVIN is a 51 year F s/p bilateral mastectomy with placement of tissue expanders, possible alloderm DOS: 3/13/2025.  BILATERAL BREAST: XPND CPX4 PLUS SM TE, 800CC  Bilateral chest were marked using magnetic port. The patient's chest was prepped sterilely. Using sterile technique, 50 cc's of normal saline were injected into bilateral tissue expanders. The patient tolerated well without any incidents.   Right Tissue Expander: 800 + 50 = 850 cc's total. Left Tissue Expander: 800 + 50 =  850 cc's total.  Patient is happy with the current size of her breasts, and would like to proceed with exchange for implants. The patient was counseled extensively about revision of reconstruction, the operation, and the perioperative recovery, as well as risks/benefits including but not limited to revisit to the operating room in the [place]-operative period, wound dehiscence, asymmetry, paresthesia, seroma, hematoma, infection, implant malposition, extrusion, deflation, fat necrosis, and capsular contracture. The patient understands all of these risks and wishes to proceed. Will plan for after her trip at the end of June.   - Will plan for revision bilateral reconstructed breasts, removal of tissue expanders, placement of implants, fat grafting, liposuction chest. - The patient was encouraged to massage the incision site 2-3 times per day for 8-10 minutes with lotion, vitamin E, or cocoa butter to encourage blood flow and to decrease scar tissue formation. - Continue supportive bra. -Will proceed with surgical planning, surgical paperwork submitted -My office will coordinate with the patient

## 2025-05-29 NOTE — HISTORY OF PRESENT ILLNESS
[FreeTextEntry1] : FLORIAN SERVIN is a 51 year F s/p bilateral mastectomy with placement of tissue expanders, possible alloderm DOS: 3/13/2025.  BILATERAL BREAST: XPND CPX4 PLUS SM TE, 800CC  Here today for further TE fills.  Has met with med/onc, does not require any chemo or radiation  Patient will be traveling to [place] at the end of June for a week and to the [place] in August  Denies cp, sob, subjective fever, chills, headache, cough, myalgia, malaise, abd pain, n/v/d, decreased appetite, and generalized weakness.

## 2025-06-25 NOTE — DISCUSSION/SUMMARY
[FreeTextEntry1] : In summary, Ms. Monroy is a 51 year old right handed woman being followed after sudden onset of abnormal movements. She states some improvement in her back pain, but there still is a lot of fluctuations. 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  Since last visit , worsening of low back pain with radiation, with burning pain in feet. her previous lumbar spine MRI showed disc herniation.  Plan: - patient had some neuropathy workup before, will obtain some more labs: B12, B6, Vit-E, protein electrophoresis.  1. Continue with Klonopin 2 mg TID (plus extra prn), Neurontin 300 mg TID, Effexor 75mg BID, Cyclobenzaprine 5 mg QD . Added lexapro, which has helped some at 10 mg.   2. Can address anxiety after surgery  We discussed the above impression, plan and recommendations during the visit. Counseling represented more then 50% of the 30 minute visit time.

## 2025-06-25 NOTE — HISTORY OF PRESENT ILLNESS
[FreeTextEntry1] : Patient is a 51 year-old right-handed female with past medical history of lower back pain, s/p surgery who comes for follow up for abnormal movements. She is able to move better. Her back pain is in the middle of her back and at times irradiates toward the left leg. The PT and exercises have helped her.   In late 2022 had 3 episodes, found on floor. No injuries. No recall of event. No tongue biting, no incontinence. No injury. No episodes of lightheadedness. She has had them before, not in a while. MRI and EEG were unremarkable.   1. Worsening burning pain of both feet and legs, Gabapentin not helping. stopped duloxetine due to recall. Tremor has been stable. Following with Rheumatology for dx of fibromyalgia. Her oncologist thinks some of this relates to prior Breast CA. Still on tamoxifen. Vitamin levels ok.  2. Last visit, mentionend worsening of lower back pain radiating to lower back.  MRI in 11/24 was unchanged from 2018 3. Getting medical marijuana (through Dr Romero), which is helping. Had rotator cuff surgery on right 4. Currently taking Klonopin 2 mg TID (plus extra prn), Neurontin 300 mg TID, Effexor 75mg BID, Cyclobenzaprine 5 mg QD . Added lexapro, which has helped some at 10 mg.  Anxiety is worse again, but no suicidal ideation 5. Still following with Dr Ellis 6. Scheduled for revision bilateral reconstructed breasts, removal of tissue expanders, placement of implants, fat grafting, liposuction chest on 6/27 6. Her sister had genetic testing for tremors (and father had symptoms too). She does not know if she ever got result. Testing here only revealed some variants of unknown significance in genes that are associated with early infantile epileptic encephalopathy (GRIN2B and JMJD1C) and Reynold syndrome/ALS (DCTN1), neither of which really fits.    vit b6: 10.3 vit b12: 591 A1c: 5.7 TSH : 0.71 ELIZABETH Anti yolanda  : neg Anti smooth muscle antibody: 1:20 ESR: 41 CRP: 8
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
## 2025-07-08 NOTE — PLAN
[FreeTextEntry2] : Problems\par  1. Pt. with shame/embarrassment about appearance of sxs.\par  2. Pt. with difficulty coping with pain.\par  3. Pt. with ongoing insomnia and resulting daytime sleepiness\par  4. pt. with difficulty coping with stress - presently with several psychosocial stressors.\par  5. Pt. out of work.\par  tx will consist of CBT approach to anxiety and coping with hope of reducing somatic sxs. Treatment will also utilize goal setting framework to help pt. ideally increase functionality. \par   [Supportive Therapy] : Supportive Therapy [de-identified] : Session conducted via telehealth, mental status WNL.  Pt. reporting ongoing fatigue, nausea and hot flashes likely related to medication.  She noted boredom and struggle to adjust to new symptoms without distraction and access to her daily routines (during post op recovery).  Encouraged pt. to engage in as much social actactivity, meditation and meaningful activity until she is cleared for physical activity.   Of note, pt. reported ongoing struggle with sleep.  As such provided pt. with sleep diary and discussed potential benefit of CBT-i. [FreeTextEntry1] : Pt. to engage in short-term CBT oriented psychotherapy.\par

## 2025-07-08 NOTE — PLAN
[FreeTextEntry2] : Problems\par  1. Pt. with shame/embarrassment about appearance of sxs.\par  2. Pt. with difficulty coping with pain.\par  3. Pt. with ongoing insomnia and resulting daytime sleepiness\par  4. pt. with difficulty coping with stress - presently with several psychosocial stressors.\par  5. Pt. out of work.\par  tx will consist of CBT approach to anxiety and coping with hope of reducing somatic sxs. Treatment will also utilize goal setting framework to help pt. ideally increase functionality. \par   [Supportive Therapy] : Supportive Therapy [de-identified] : Session conducted via telehealth, mental status notable for depressed mood.  Utilized supportive approach as pt. discussed past several months.  She noted that her recovery from the double mastectomy was quite difficult.  She also updated me on current medical status.  Pt. just completed reconstruction surgery and was started on tomoxifan.  Processed her feelings surrounding the most recent events.   [FreeTextEntry1] : Pt. to engage in short-term CBT oriented psychotherapy.\par

## 2025-07-09 NOTE — HISTORY OF PRESENT ILLNESS
[FreeTextEntry1] : FLORIAN SERVIN is a 51 year F s/p bilateral mastectomy with placement of tissue expanders, possible alloderm DOS: 3/13/2025.  BILATERAL BREAST: XPND CPX4 PLUS SM TE, 800CC More recently, she is s/p revision of reconstructed breasts, removal of tissue expanders, placement of implants DOS: 6/27/2025.   She is POD#6. This is her first post-op visit.  Continues on PO abx. C/o some soreness  Denies cp, sob, subjective fever, chills, headache, cough, myalgia, malaise, abd pain, n/v/d, decreased appetite, and generalized weakness.

## 2025-07-09 NOTE — ASSESSMENT
[FreeTextEntry1] : FLORIAN SERVIN is a 51 year F s/p bilateral mastectomy with placement of tissue expanders, possible alloderm DOS: 3/13/2025.  BILATERAL BREAST: XPND CPX4 PLUS SM TE, 800CC More recently, she is s/p revision of reconstructed breasts, removal of tissue expanders, placement of implants DOS: 6/27/2025.   Exam findings discussed with patient. Implant card provided.  -Cont PO abx -Cont supportive bra  -f/u 2 weeks

## 2025-07-09 NOTE — ADDENDUM
[FreeTextEntry1] :  This note was authored by Nancy Watson working as a scribe for Dr.Victor Sheppard. I, Dr. Azael Sheppard have reviewed the content of this note and confirm it is true and accurate. I personally performed the history and physical examination and made all the decisions 07/02/2025

## 2025-07-09 NOTE — PHYSICAL EXAM
[TextEntry] : Bilateral breasts:  Implants are soft and in good position Incisions are c/d/i. No bleeding or drainage. No gaps or open areas

## 2025-07-17 NOTE — PHYSICAL EXAM
[TextEntry] : B/L breasts:  RIGHT & LEFT: Allergan SCX Natrelle Inspira cohesive smooth round breast implant, 800 cc. (DOS 6/27/25). implants soft and in good condition. Minimal to mild post-op edema, L>R. Areas of fat grafting to upper poles with minimal bruising

## 2025-07-17 NOTE — HISTORY OF PRESENT ILLNESS
[FreeTextEntry1] : FLORIAN SERVIN is a 51 year F s/p revision of b/l reconstructed breasts, removal of tissue expanders and placement of implants, b/l capsulectomies, liposuction with fat grafting. DOS 6/27/25. RIGHT & LEFT: Allergan SCX Natrelle Inspira cohesive smooth round breast implant, 800 cc. (DOS 6/27/25).  Patient endorses some soreness to the incision site, denies cp, sob, subjective fever, chills, and sweats.

## 2025-07-17 NOTE — ADDENDUM
[FreeTextEntry1] : This note was authored by Heidy Gordon working as a scribe for Dr. Azael Sheppard. I, Dr. Azael Sheppard have reviewed the content of this note and confirm it is true and accurate. I personally performed the history and physical examination and made all the decisions 07/17/2025

## 2025-07-17 NOTE — ASSESSMENT
[FreeTextEntry1] : FLORIAN SERVIN is a 51 year F s/p revision of b/l reconstructed breasts, removal of tissue expanders and placement of implants, b/l capsulectomies, liposuction with fat grafting. DOS 6/27/25. RIGHT & LEFT: Allergan SCX Natrelle Inspira cohesive smooth round breast implant, 800 cc. (DOS 6/27/25).  Patient is overall healing well. She reports some limited ROM to upper extremities, requesting to resume [facility] therapy.   - Massage the incision site 2-3 times per day for 8-10 minutes with lotion, vitamin E, or cocoa butter to encourage blood flow and to decrease scar tissue formation. - [facility] breast cancer therapy referral made - Continue supportive bra - F/u 3-4 weeks
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
## 2025-07-23 NOTE — PLAN
[FreeTextEntry2] : Problems\par  1. Pt. with shame/embarrassment about appearance of sxs.\par  2. Pt. with difficulty coping with pain.\par  3. Pt. with ongoing insomnia and resulting daytime sleepiness\par  4. pt. with difficulty coping with stress - presently with several psychosocial stressors.\par  5. Pt. out of work.\par  tx will consist of CBT approach to anxiety and coping with hope of reducing somatic sxs. Treatment will also utilize goal setting framework to help pt. ideally increase functionality. \par   [Supportive Therapy] : Supportive Therapy [de-identified] : Session conducted via telehealth, mental status WNL.  Pt noted some improvement in mood with efforts to be more engaged in meaningful and social activity.  Pt. noted ongoing symptoms from medications and shifting of new breast implant; however, she is starting physical therapy with the hopes of improving symptoms.  Commended pt's efforts and discussed strategies for maintaining progress.  Revisited discussion on sleep.  Utilized a CBT-i approach to introduce concepts of sleep restriction and stimulus control.  [FreeTextEntry1] : Pt. to engage in short-term CBT oriented psychotherapy.\par

## 2025-07-24 NOTE — REVIEW OF SYSTEMS
[Joint Pain] : joint pain [Joint Stiffness] : joint stiffness [Muscle Pain] : muscle pain [Hot Flashes] : hot flashes [Negative] : Gastrointestinal [FreeTextEntry9] : shooting pain down left leg [de-identified] : Pain around breast expanders

## 2025-07-24 NOTE — REVIEW OF SYSTEMS
[Joint Pain] : joint pain [Joint Stiffness] : joint stiffness [Muscle Pain] : muscle pain [Hot Flashes] : hot flashes [Negative] : Gastrointestinal [FreeTextEntry9] : shooting pain down left leg [de-identified] : Pain around breast expanders

## 2025-07-24 NOTE — PHYSICAL EXAM
[Fully active, able to carry on all pre-disease performance without restriction] : Status 0 - Fully active, able to carry on all pre-disease performance without restriction [Normal] : normoactive bowel sounds, soft and nontender, no hepatosplenomegaly or masses appreciated [de-identified] : B/l mastectomy with reconstruction.   [de-identified] : diffuse tenderness on palpation of lower back [de-identified] : diffuse tenderness on palpation of lower back

## 2025-07-24 NOTE — ASSESSMENT
[FreeTextEntry1] : She is a 52 y/o BRCA negative premenopausal F with left breast Stage I (T1N0) invasive ductal carcinoma that is ER %, [place] 80%, Her2 negative s/p left mastectomy and SLNB and prophylactic right mastectomy with SLNB on 3.12.2025 with Dr Aly. Oncotype score of 11, there is no role for chemotherapy. She is currently on Tamoxifen with plans to possibly switch to aromatase inhibitors once she becomes postmenopausal.    The patient continues to experience fatigue, hot flashes, and joint stiffness. She is currently on an SSRI and gabapentin, and resources for herbal supplements to help with hot flashes were provided. She is also interested in pursuing physical therapy for her lower back and MRI for further evaluation. Repeat bloodwork will be obtained. Questions were answered to her satisfaction. Next follow up in 4 months but earlier if any new symptoms.  Case discussed with Dr. Ma.   Kenrick Velazquez DO Hematology/Oncology Fellow PGY-4

## 2025-07-24 NOTE — ASSESSMENT
[FreeTextEntry1] : She is a 50 y/o BRCA negative premenopausal F with left breast Stage I (T1N0) invasive ductal carcinoma that is ER %, [place] 80%, Her2 negative s/p left mastectomy and SLNB and prophylactic right mastectomy with SLNB on 3.12.2025 with Dr Aly. Oncotype score of 11, there is no role for chemotherapy. She is currently on Tamoxifen with plans to possibly switch to aromatase inhibitors once she becomes postmenopausal.    The patient continues to experience fatigue, hot flashes, and joint stiffness. She is currently on an SSRI and gabapentin, and resources for herbal supplements to help with hot flashes were provided. She is also interested in pursuing physical therapy for her lower back and MRI for further evaluation. Repeat bloodwork will be obtained. Questions were answered to her satisfaction. Next follow up in 4 months but earlier if any new symptoms.  Case discussed with Dr. Ma.   Kenrick Velazquez DO Hematology/Oncology Fellow PGY-4

## 2025-07-24 NOTE — PHYSICAL EXAM
[Fully active, able to carry on all pre-disease performance without restriction] : Status 0 - Fully active, able to carry on all pre-disease performance without restriction [Normal] : normoactive bowel sounds, soft and nontender, no hepatosplenomegaly or masses appreciated [de-identified] : B/l mastectomy with reconstruction.   [de-identified] : diffuse tenderness on palpation of lower back [de-identified] : diffuse tenderness on palpation of lower back

## 2025-07-24 NOTE — HISTORY OF PRESENT ILLNESS
[Disease: _____________________] : Disease: [unfilled] [T: ___] : T[unfilled] [AJCC Stage: ____] : AJCC Stage: [unfilled] [de-identified] : Age 51: left breast cancer Screen detected: she had mammogram done on 12/16/2024 which showed left breast indeterminate calcifications in the upper inner posterior breast. She had left medial breast biopsy on 1/3/2025 which showed DCIS that was >95%, [place] >90%. She had MRI of the breast done on 1/13/2025 which showed left upper inner breast biopsy proven DCIS spanning 4.5 cm and mildly prominent L axillary LN. She had ultrasound of the left axillary LN on 1/21/2025 that showed no abnormal LN. She underwent left mastectomy with SLNB and prophylactic right mastectomy with SLNB o 3/12/2025 with Dr Aly. The pathology showed left mastectomy invasive ductal carcinoma measuring 8 mm and DCIS spanning 38 mm along with negative SLNB (0/4). IHC ER %, [place] 80%, Her2 negative (0). The prophylactic right breast mastectomy showed fibroproliferative changes and negative SLNB (0/3). She had Oncotype Dx done with score of 11 indicating 9-year risk of distant recurrence of 3% with endocrine therapy. Started on tamoxifen 4/2025.         [de-identified] :  invasive ductal carcinoma ER 91 to 100% [place] 80%, Her2 negative   TNM stage: T1, N0 [de-identified] : Myriad Mimi: no actionable mutations 2025: VUS AXIN2 tamoxifen 4/2025 to present  [de-identified] : The patient continues to experience fatigue, hot flashes, joint stiffness, and a new shooting pain down the left leg along with lower back pain. She also reports ongoing tenderness over the breast expander and plans to start physical therapy for this. Otherwise, her symptoms have been present but are more or less stable. Her last menstrual period was in December 2024, and no recent bloodwork has been done.

## 2025-07-24 NOTE — HISTORY OF PRESENT ILLNESS
[Disease: _____________________] : Disease: [unfilled] [T: ___] : T[unfilled] [AJCC Stage: ____] : AJCC Stage: [unfilled] [de-identified] : Age 51: left breast cancer Screen detected: she had mammogram done on 12/16/2024 which showed left breast indeterminate calcifications in the upper inner posterior breast. She had left medial breast biopsy on 1/3/2025 which showed DCIS that was >95%, [place] >90%. She had MRI of the breast done on 1/13/2025 which showed left upper inner breast biopsy proven DCIS spanning 4.5 cm and mildly prominent L axillary LN. She had ultrasound of the left axillary LN on 1/21/2025 that showed no abnormal LN. She underwent left mastectomy with SLNB and prophylactic right mastectomy with SLNB o 3/12/2025 with Dr Aly. The pathology showed left mastectomy invasive ductal carcinoma measuring 8 mm and DCIS spanning 38 mm along with negative SLNB (0/4). IHC ER %, [place] 80%, Her2 negative (0). The prophylactic right breast mastectomy showed fibroproliferative changes and negative SLNB (0/3). She had Oncotype Dx done with score of 11 indicating 9-year risk of distant recurrence of 3% with endocrine therapy. Started on tamoxifen 4/2025.         [de-identified] :  invasive ductal carcinoma ER 91 to 100% [place] 80%, Her2 negative   TNM stage: T1, N0 [de-identified] : Myriad Mimi: no actionable mutations 2025: VUS AXIN2 tamoxifen 4/2025 to present  [de-identified] : The patient continues to experience fatigue, hot flashes, joint stiffness, and a new shooting pain down the left leg along with lower back pain. She also reports ongoing tenderness over the breast expander and plans to start physical therapy for this. Otherwise, her symptoms have been present but are more or less stable. Her last menstrual period was in December 2024, and no recent bloodwork has been done.

## 2025-07-24 NOTE — END OF VISIT
[] : Fellow [FreeTextEntry3] : History of breast cancer Stage I s/p mastectomy with Oncotype of 11: started on tamoxifen 4/2025. She has expected hot flashes on therapy despite gabapentin. We reviewed supplements to help with hot flashes: rhubarb versus bee pollen. We reviewed expectations on tamoxifen. Last blood work done 3/2025. She has history of lower back pain predating the tamoxifen but having sciatica: last MRI of the sine was done 11/2024 showing lumbar spondylosis. Gave Rx for physical therapy for lower back. She will let us know if any worsening back pain: if so, we would repeat MRI spine.  [Time Spent: ___ minutes] : I have spent [unfilled] minutes of time on the encounter which excludes teaching and separately reported services.